# Patient Record
Sex: FEMALE | Race: WHITE | HISPANIC OR LATINO | Employment: UNEMPLOYED | URBAN - METROPOLITAN AREA
[De-identification: names, ages, dates, MRNs, and addresses within clinical notes are randomized per-mention and may not be internally consistent; named-entity substitution may affect disease eponyms.]

---

## 2017-08-30 ENCOUNTER — APPOINTMENT (OUTPATIENT)
Dept: RADIOLOGY | Facility: MEDICAL CENTER | Age: 27
End: 2017-08-30
Attending: EMERGENCY MEDICINE
Payer: COMMERCIAL

## 2017-08-30 ENCOUNTER — HOSPITAL ENCOUNTER (EMERGENCY)
Facility: MEDICAL CENTER | Age: 27
End: 2017-08-30
Attending: EMERGENCY MEDICINE
Payer: COMMERCIAL

## 2017-08-30 VITALS
WEIGHT: 180.78 LBS | BODY MASS INDEX: 30.12 KG/M2 | HEIGHT: 65 IN | OXYGEN SATURATION: 97 % | DIASTOLIC BLOOD PRESSURE: 68 MMHG | TEMPERATURE: 97.5 F | RESPIRATION RATE: 16 BRPM | HEART RATE: 82 BPM | SYSTOLIC BLOOD PRESSURE: 138 MMHG

## 2017-08-30 DIAGNOSIS — F07.81 POST CONCUSSIVE SYNDROME: ICD-10-CM

## 2017-08-30 DIAGNOSIS — S09.90XA CLOSED HEAD INJURY, INITIAL ENCOUNTER: ICD-10-CM

## 2017-08-30 PROCEDURE — 99284 EMERGENCY DEPT VISIT MOD MDM: CPT

## 2017-08-30 PROCEDURE — 70450 CT HEAD/BRAIN W/O DYE: CPT

## 2017-08-30 ASSESSMENT — ENCOUNTER SYMPTOMS
HEADACHES: 1
TREMORS: 0
BLURRED VISION: 1
SPEECH CHANGE: 0
WEAKNESS: 0
TINGLING: 0
NECK PAIN: 0
ABDOMINAL PAIN: 0
VOMITING: 0
FOCAL WEAKNESS: 0
SENSORY CHANGE: 0
NAUSEA: 0
FEVER: 0
SHORTNESS OF BREATH: 0
DIZZINESS: 1

## 2017-08-30 ASSESSMENT — PAIN SCALES - GENERAL
PAINLEVEL_OUTOF10: 0
PAINLEVEL_OUTOF10: 0

## 2017-08-30 ASSESSMENT — LIFESTYLE VARIABLES: DO YOU DRINK ALCOHOL: NO
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
## 2017-08-30 NOTE — LETTER
"   [facility], EMERGENCY DEPT   [address], [place] [address]  Phone: Dept: [phone] - Fax:        [facility] Progress Report and Disability Certification  Date of Service: 8/30/2017   No Show:  No  Date / Time of Next Visit:     Claim Information   Patient Name: Tiffanie Bauer  Claim Number:  [id number]   Employer: [employer]  Date of Injury: 8/25/2017     Insurer / TPA: [employer] ID / SSN:    Occupation:  Diagnosis: Diagnoses of Post concussive syndrome and Closed head injury, initial encounter were pertinent to this visit.    Medical Information   Related to Industrial Injury? Yes ***   Subjective Complaints:  Condition demonstrating findings and symptoms of postconcussive syndrome. Sent here from [facility] for a repeat CT scan to \"rule out bleed\".   Objective Findings: Normal neurologic exam. Patient demonstrating clinical symptoms of postconcussive syndrome.   Pre-Existing Condition(s): No known pre-existing conditions   Assessment:   Condition Same    Status: Additional Care Required  Comments:patient is to follow-up with Worker's Compensation  Permanent Disability:No    Plan:   Comments:recommend patient be off work with light duty upon return    Diagnostics: CT  Comments:CT showed no abnormalities    Comments:  Recommend no work for the rest of this week. Recommend when patient returns to work on Monday, that she engage in light duty which would include no extended periods of being on the computer, reading or concentrating without frequent breaks.    Disability Information   Status:      From:     Through:   Restrictions are:     Physical Restrictions   Sitting:    Standing:    Stooping:    Bending:      Squatting:    Walking:    Climbing:    Pushing:      Pulling:    Other:    Reaching Above Shoulder (L):   Reaching Above Shoulder (R):       Reaching Below Shoulder (L):    Reaching Below Shoulder " (R):      Not to exceed Weight Limits   Carrying(hrs):   Weight Limit(lb):   Lifting(hrs):   Weight  Limit(lb):     Comments: Recommend no work for the rest of this week. Recommend when patient returns to work on Monday, that she engage in light duty which would include no extended periods of being on the computer, reading or concentrating without frequent breaks.    Repetitive Actions   Hands: i.e. Fine Manipulations from Grasping:     Feet: i.e. Operating Foot Controls:     Driving / Operate Machinery:     Physician Name: Uriel Ramirez Physician Signature: URIEL Ochoa D.O. e-Signature:  , Medical Director   Clinic Name / Location: [facility], EMERGENCY DEPT  [address] [place] [address]  [phone]     Clinic Phone Number: Dept: [phone]   Appointment Time:  Visit Start Time:    Check-In Time:  2:27 PM Visit Discharge Time:    Original-Treating Physician or Chiropractor    Page 2-Insurer/TPA    Page 3-Employer    Page 4-Employee
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
## 2017-08-30 NOTE — LETTER
"   [facility], EMERGENCY DEPT   [address], [place] [address]  Phone: Dept: [phone] - Fax:        [facility] Progress Report and Disability Certification  Date of Service: 8/30/2017   No Show:  No  Date / Time of Next Visit:     Claim Information   Patient Name: Tiffanie Bauer  Claim Number:     Employer: [employer]  Date of Injury: 8/25/2017     Insurer / TPA: [employer] ID / SSN:     Occupation:  Diagnosis: Diagnoses of Post concussive syndrome and Closed head injury, initial encounter were pertinent to this visit.    Medical Information   Related to Industrial Injury? Yes ***   Subjective Complaints:  Condition demonstrating findings and symptoms of postconcussive syndrome. Sent here from [facility] for a repeat CT scan to \"rule out bleed\".   Objective Findings: Normal neurologic exam. Patient demonstrating clinical symptoms of postconcussive syndrome.   Pre-Existing Condition(s): No known pre-existing conditions   Assessment:   Condition Same    Status: Additional Care Required  Comments:patient is to follow-up with Worker's Compensation  Permanent Disability:No    Plan:   Comments:recommend patient be off work with light duty upon return    Diagnostics: CT  Comments:CT showed no abnormalities    Comments:  Recommend no work for the rest of this week. Recommend when patient returns to work on Monday, that she engage in light duty which would include no extended periods of being on the computer, reading or concentrating without frequent breaks.    Disability Information   Status:      From:     Through:   Restrictions are:     Physical Restrictions   Sitting:    Standing:    Stooping:    Bending:      Squatting:    Walking:    Climbing:    Pushing:      Pulling:    Other:    Reaching Above Shoulder (L):   Reaching Above Shoulder (R):       Reaching Below Shoulder (L):    Reaching Below Shoulder (R):      Not to " exceed Weight Limits   Carrying(hrs):   Weight Limit(lb):   Lifting(hrs):   Weight  Limit(lb):     Comments: Recommend no work for the rest of this week. Recommend when patient returns to work on Monday, that she engage in light duty which would include no extended periods of being on the computer, reading or concentrating without frequent breaks.    Repetitive Actions   Hands: i.e. Fine Manipulations from Grasping:     Feet: i.e. Operating Foot Controls:     Driving / Operate Machinery:     Physician Name: Uriel Ramirez Physician Signature: URIEL Ochoa D.O. e-Signature:  , Medical Director   Clinic Name / Location: [facility], EMERGENCY DEPT  [address] [place] [address]  [phone]     Clinic Phone Number: Dept: [phone]   Appointment Time:  Visit Start Time:    Check-In Time:  2:27 PM Visit Discharge Time:    Original-Treating Physician or Chiropractor    Page 2-Insurer/TPA    Page 3-Employer    Page 4-Employee

## 2017-08-30 NOTE — LETTER
"   [facility], EMERGENCY DEPT   [address], [place] [address]  Phone: Dept: [phone] - Fax:        [facility] Progress Report and Disability Certification  Date of Service: 8/30/2017   No Show:  No  Date / Time of Next Visit:     Claim Information   Patient Name: Tiffanie Bauer  Claim Number:  [id number]   Employer: [employer]  Date of Injury: 8/25/2017     Insurer / TPA: [employer] ID / SSN: [id number]    Occupation:  Diagnosis: Diagnoses of Post concussive syndrome and Closed head injury, initial encounter were pertinent to this visit.    Medical Information   Related to Industrial Injury? Yes ***   Subjective Complaints:  Condition demonstrating findings and symptoms of postconcussive syndrome. Sent here from [facility] for a repeat CT scan to \"rule out bleed\".   Objective Findings: Normal neurologic exam. Patient demonstrating clinical symptoms of postconcussive syndrome.   Pre-Existing Condition(s): No known pre-existing conditions   Assessment:   Condition Same    Status: Additional Care Required  Comments:patient is to follow-up with Worker's Compensation  Permanent Disability:No    Plan:   Comments:recommend patient be off work with light duty upon return    Diagnostics: CT  Comments:CT showed no abnormalities    Comments:  Recommend no work for the rest of this week. Recommend when patient returns to work on Monday, that she engage in light duty which would include no extended periods of being on the computer, reading or concentrating without frequent breaks.    Disability Information   Status:      From:     Through:   Restrictions are:     Physical Restrictions   Sitting:    Standing:    Stooping:    Bending:      Squatting:    Walking:    Climbing:    Pushing:      Pulling:    Other:    Reaching Above Shoulder (L):   Reaching Above Shoulder (R):       Reaching Below Shoulder (L):    Reaching Below Shoulder " (R):      Not to exceed Weight Limits   Carrying(hrs):   Weight Limit(lb):   Lifting(hrs):   Weight  Limit(lb):     Comments: Recommend no work for the rest of this week. Recommend when patient returns to work on Monday, that she engage in light duty which would include no extended periods of being on the computer, reading or concentrating without frequent breaks.    Repetitive Actions   Hands: i.e. Fine Manipulations from Grasping:     Feet: i.e. Operating Foot Controls:     Driving / Operate Machinery:     Physician Name: Uriel Ramirez Physician Signature: URIEL Ochoa D.O. e-Signature:  , Medical Director   Clinic Name / Location: [facility], EMERGENCY DEPT  [address] [place] [address]  [phone]     Clinic Phone Number: Dept: [phone]   Appointment Time:  Visit Start Time:    Check-In Time:  2:27 PM Visit Discharge Time:    Original-Treating Physician or Chiropractor    Page 2-Insurer/TPA    Page 3-Employer    Page 4-Employee

## 2017-08-31 NOTE — DISCHARGE INSTRUCTIONS
Post-Concussion Syndrome  Post-concussion syndrome describes the symptoms that can occur after a head injury. These symptoms can last from weeks to months.  CAUSES   It is not clear why some head injuries cause post-concussion syndrome. It can occur whether your head injury was mild or severe and whether you were wearing head protection or not.   SIGNS AND SYMPTOMS  · Memory difficulties.  · Dizziness.  · Headaches.  · Double vision or blurry vision.  · Sensitivity to light.  · Hearing difficulties.  · Depression.  · Tiredness.  · Weakness.  · Difficulty with concentration.  · Difficulty sleeping or staying asleep.  · Vomiting.  · Poor balance or instability on your feet.  · Slow reaction time.  · Difficulty learning and remembering things you have heard.  DIAGNOSIS   There is no test to determine whether you have post-concussion syndrome. Your health care provider may order an imaging scan of your brain, such as a CT scan, to check for other problems that may be causing your symptoms (such as a severe injury inside your skull).  TREATMENT   Usually, these problems disappear over time without medical care. Your health care provider may prescribe medicine to help ease your symptoms. It is important to follow up with a neurologist to evaluate your recovery and address any lingering symptoms or issues.  HOME CARE INSTRUCTIONS   · Take medicines only as directed by your health care provider. Do not take aspirin. Aspirin can slow blood clotting.  · Sleep with your head slightly elevated to help with headaches.  · Avoid any situation where there is potential for another head injury. This includes football, hockey, soccer, basketball, martial arts, downhill snow sports, and horseback riding. Your condition will get worse every time you experience a concussion. You should avoid these activities until you are evaluated by the appropriate follow-up health care providers.  · Keep all follow-up visits as directed by your health  care provider. This is important.  SEEK MEDICAL CARE IF:  · You have increased problems paying attention or concentrating.  · You have increased difficulty remembering or learning new information.  · You need more time to complete tasks or assignments than before.  · You have increased irritability or decreased ability to cope with stress.  · You have more symptoms than before.  Seek medical care if you have any of the following symptoms for more than two weeks after your injury:  · Lasting (chronic) headaches.  · Dizziness or balance problems.  · Nausea.  · Vision problems.  · Increased sensitivity to noise or light.  · Depression or mood swings.  · Anxiety or irritability.  · Memory problems.  · Difficulty concentrating or paying attention.  · Sleep problems.  · Feeling tired all the time.  SEEK IMMEDIATE MEDICAL CARE IF:  · You have confusion or unusual drowsiness.  · Others find it difficult to wake you up.  · You have nausea or persistent, forceful vomiting.  · You feel like you are moving when you are not (vertigo). Your eyes may move rapidly back and forth.  · You have convulsions or faint.  · You have severe, persistent headaches that are not relieved by medicine.  · You cannot use your arms or legs normally.  · One of your pupils is larger than the other.  · You have clear or bloody discharge from your nose or ears.  · Your problems are getting worse, not better.  MAKE SURE YOU:  · Understand these instructions.  · Will watch your condition.  · Will get help right away if you are not doing well or get worse.     This information is not intended to replace advice given to you by your health care provider. Make sure you discuss any questions you have with your health care provider.     Document Released: 06/09/2003 Document Revised: 01/08/2016 Document Reviewed: 03/25/2015  [employer] Interactive Patient Education ©2016 [employer]      Head Injury, Adult  You have received a head injury. It does not appear  serious at this time. Headaches and vomiting are common following head injury. It should be easy to awaken from sleeping. Sometimes it is necessary for you to stay in the emergency department for a while for observation. Sometimes admission to the hospital may be needed. After injuries such as yours, most problems occur within the first 24 hours, but side effects may occur up to 7-10 days after the injury. It is important for you to carefully monitor your condition and contact your health care provider or seek immediate medical care if there is a change in your condition.  WHAT ARE THE TYPES OF HEAD INJURIES?  Head injuries can be as minor as a bump. Some head injuries can be more severe. More severe head injuries include:  · A jarring injury to the brain (concussion).  · A bruise of the brain (contusion). This mean there is bleeding in the brain that can cause swelling.  · A cracked skull (skull fracture).  · Bleeding in the brain that collects, clots, and forms a bump (hematoma).  WHAT CAUSES A HEAD INJURY?  A serious head injury is most likely to happen to someone who is in a car wreck and is not wearing a seat belt. Other causes of major head injuries include bicycle or motorcycle accidents, sports injuries, and falls.  HOW ARE HEAD INJURIES DIAGNOSED?  A complete history of the event leading to the injury and your current symptoms will be helpful in diagnosing head injuries. Many times, pictures of the brain, such as CT or MRI are needed to see the extent of the injury. Often, an overnight hospital stay is necessary for observation.   WHEN SHOULD I SEEK IMMEDIATE MEDICAL CARE?   You should get help right away if:  · You have confusion or drowsiness.  · You feel sick to your stomach (nauseous) or have continued, forceful vomiting.  · You have dizziness or unsteadiness that is getting worse.  · You have severe, continued headaches not relieved by medicine. Only take over-the-counter or prescription medicines for  pain, fever, or discomfort as directed by your health care provider.  · You do not have normal function of the arms or legs or are unable to walk.  · You notice changes in the black spots in the center of the colored part of your eye (pupil).  · You have a clear or bloody fluid coming from your nose or ears.  · You have a loss of vision.  During the next 24 hours after the injury, you must stay with someone who can watch you for the warning signs. This person should contact local emergency services (911 in the [place] if you have seizures, you become unconscious, or you are unable to wake up.  HOW CAN I PREVENT A HEAD INJURY IN THE FUTURE?  The most important factor for preventing major head injuries is avoiding motor vehicle accidents.  To minimize the potential for damage to your head, it is crucial to wear seat belts while riding in motor vehicles. Wearing helmets while bike riding and playing collision sports (like football) is also helpful. Also, avoiding dangerous activities around the house will further help reduce your risk of head injury.   WHEN CAN I RETURN TO NORMAL ACTIVITIES AND ATHLETICS?  You should be reevaluated by your health care provider before returning to these activities. If you have any of the following symptoms, you should not return to activities or contact sports until 1 week after the symptoms have stopped:  · Persistent headache.  · Dizziness or vertigo.  · Poor attention and concentration.  · Confusion.  · Memory problems.  · Nausea or vomiting.  · Fatigue or tire easily.  · Irritability.  · Intolerant of bright lights or loud noises.  · Anxiety or depression.  · Disturbed sleep.  MAKE SURE YOU:   · Understand these instructions.  · Will watch your condition.  · Will get help right away if you are not doing well or get worse.     This information is not intended to replace advice given to you by your health care provider. Make sure you discuss any questions you have with your health care  provider.     Document Released: 12/18/2006 Document Revised: 01/08/2016 Document Reviewed: 08/25/2014  [employer] Interactive Patient Education ©2016 [employer]

## 2018-05-17 ENCOUNTER — HOSPITAL ENCOUNTER (OUTPATIENT)
Dept: LAB | Facility: MEDICAL CENTER | Age: 28
End: 2018-05-17
Attending: OBSTETRICS & GYNECOLOGY
Payer: COMMERCIAL

## 2018-05-17 LAB
ESTRADIOL SERPL-MCNC: 280 PG/ML
FSH SERPL-ACNC: 6 MIU/ML
PROGEST SERPL-MCNC: 1.07 NG/ML

## 2018-05-17 PROCEDURE — 36415 COLL VENOUS BLD VENIPUNCTURE: CPT

## 2018-05-17 PROCEDURE — 83001 ASSAY OF GONADOTROPIN (FSH): CPT

## 2018-05-17 PROCEDURE — 82670 ASSAY OF TOTAL ESTRADIOL: CPT

## 2018-05-17 PROCEDURE — 84144 ASSAY OF PROGESTERONE: CPT

## 2018-08-10 ENCOUNTER — NON-PROVIDER VISIT (OUTPATIENT)
Dept: OBGYN | Facility: CLINIC | Age: 28
End: 2018-08-10

## 2018-08-10 DIAGNOSIS — Z32.01 POSITIVE URINE PREGNANCY TEST: ICD-10-CM

## 2018-08-10 LAB
INT CON NEG: NEGATIVE
INT CON POS: POSITIVE
POC URINE PREGNANCY TEST: POSITIVE

## 2018-08-10 PROCEDURE — 81025 URINE PREGNANCY TEST: CPT | Performed by: OBSTETRICS & GYNECOLOGY

## 2018-09-28 ENCOUNTER — INITIAL PRENATAL (OUTPATIENT)
Dept: OBGYN | Facility: CLINIC | Age: 28
End: 2018-09-28

## 2018-09-28 ENCOUNTER — HOSPITAL ENCOUNTER (OUTPATIENT)
Facility: MEDICAL CENTER | Age: 28
End: 2018-09-28
Attending: NURSE PRACTITIONER
Payer: COMMERCIAL

## 2018-09-28 VITALS — WEIGHT: 207 LBS | BODY MASS INDEX: 34.45 KG/M2 | SYSTOLIC BLOOD PRESSURE: 112 MMHG | DIASTOLIC BLOOD PRESSURE: 76 MMHG

## 2018-09-28 DIAGNOSIS — Z34.82 ENCOUNTER FOR SUPERVISION OF OTHER NORMAL PREGNANCY, SECOND TRIMESTER: Primary | ICD-10-CM

## 2018-09-28 DIAGNOSIS — Z34.82 ENCOUNTER FOR SUPERVISION OF OTHER NORMAL PREGNANCY, SECOND TRIMESTER: ICD-10-CM

## 2018-09-28 LAB
APPEARANCE UR: CLEAR
BILIRUB UR STRIP-MCNC: NORMAL MG/DL
COLOR UR AUTO: YELLOW
GLUCOSE UR STRIP.AUTO-MCNC: NEGATIVE MG/DL
KETONES UR STRIP.AUTO-MCNC: NORMAL MG/DL
LEUKOCYTE ESTERASE UR QL STRIP.AUTO: NORMAL
NITRITE UR QL STRIP.AUTO: NEGATIVE
PH UR STRIP.AUTO: 5.5 [PH] (ref 5–8)
PROT UR QL STRIP: 30 MG/DL
RBC UR QL AUTO: NEGATIVE
SP GR UR STRIP.AUTO: 1.03
UROBILINOGEN UR STRIP-MCNC: NORMAL MG/DL

## 2018-09-28 PROCEDURE — 59401 PR NEW OB VISIT: CPT | Performed by: NURSE PRACTITIONER

## 2018-09-28 PROCEDURE — 81002 URINALYSIS NONAUTO W/O SCOPE: CPT | Performed by: NURSE PRACTITIONER
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
## 2018-09-28 NOTE — PATIENT INSTRUCTIONS
P:  1.  GC/CT done. Pap records requested.         2.  Prenatal labs ordered - lab slip given        3.  Discussed PNV, diet, and adequate water intake        4.  NOB packet given        5.  Return to office in 4 wks        6.  Complete OB US in 7 wks

## 2018-09-28 NOTE — PROGRESS NOTES
Subjective:   Tiffanie Jaffe is a 28 y.o.   @ A: 13w0d HALEY: Estimated Date of Delivery: 19  per LNMP who presents for her new OB exam.  She has no complaints.  Desires AFP.  Declines CF.  Reports no FM, VB, LOF, or cramping.  Denies dysuria, vaginal DC.  Pt is  and lives with  and daughter. Not presently working.  Pregnancy is planned and wanted.  Unsure about Centering Pregnancy.     Initial Prenatal Visit          HPI  Review of Systems   All other systems reviewed and are negative.         Objective:     /76   Wt 93.9 kg (207 lb)   LMP 2018   BMI 34.45 kg/m²    Wet mount: deferred  FHTs: 155, BSUS +FM and CRL c/w LNMP.    Fundal ht: 13       Physical Exam   Constitutional: She is oriented to person, place, and time. She appears well-developed and well-nourished.   HENT:   Head: Normocephalic and atraumatic.   Eyes:   Eye and ear exam deferred   Neck: Normal range of motion. Neck supple. No thyromegaly present.   Cardiovascular: Normal rate, regular rhythm, normal heart sounds and intact distal pulses.    Pulmonary/Chest: Effort normal and breath sounds normal. Right breast exhibits no inverted nipple, no mass, no nipple discharge, no skin change and no tenderness. Left breast exhibits no inverted nipple, no mass, no nipple discharge, no skin change and no tenderness.   Abdominal: Soft. Bowel sounds are normal.    x 1 - proven to 3100g   Genitourinary: Vagina normal and uterus normal. Pelvic exam was performed with patient supine. There is no rash, tenderness, lesion or injury on the right labia. There is no rash, tenderness, lesion or injury on the left labia. Cervix exhibits no motion tenderness, no discharge and no friability. Right adnexum displays no mass, no tenderness and no fullness. Left adnexum displays no mass, no tenderness and no fullness.   Musculoskeletal: Normal range of motion.   Neurological: She is alert and oriented to person, place,  and time.   Skin: Skin is warm and dry. Capillary refill takes less than 2 seconds.   Psychiatric: She has a normal mood and affect. Her behavior is normal. Judgment and thought content normal.   Nursing note and vitals reviewed.          A:   1.  IUP @ 13w0d HALEY: Estimated Date of Delivery: 4/5/19 per [facility]         2.  S=D        3.    Patient Active Problem List    Diagnosis Date Noted   • Encounter for supervision of other normal pregnancy, second trimester 09/28/2018         P:  1.  GC/CT done. Pap records requested.         2.  Prenatal labs ordered - lab slip given        3.  Discussed PNV, diet, and adequate water intake        4.  NOB packet given        5.  Return to office in 4 wks        6.  Complete OB US in 7 wks

## 2018-09-28 NOTE — LETTER
Cystic Fibrosis Carrier Testing  Tiffanie Jaffe    The following information is about a blood test that can be done to determine if you and/or your partner carry the gene for cystic fibrosis.    WHAT IS CYSTIC FIBROSIS?  · Cystic fibrosis (CF) is an inherited disease that affects more than 25,000 American children and young adults.  · Symptoms of CF vary but include lung congestion, pneumonia, diarrhea and poor growth.  Most people with CF have severe medical problems and some die at a young age.  Others have so few symptoms they are unaware they have CF.  · CF does not affect intelligence.  · Although there is no cure for CF at this time, scientists are making progress in improving treatment and in searching for a cure.  In the past many people with CF  at a very young age.  Today, many are living into their 20’s and 30’s.    IS THERE A CHANCE MY BABY COULD HAVE CYSTIC FIBROSIS?  · You can have a child with CF even if there is no history in your family (see chart below).  · CF testing can help determine if you are a carrier and at risk to have a child with CF.  Note: if both parents are carriers, there is a 1 in 4 (25%) chance with each pregnancy that they will have a child with CF.  · Carriers have one normal CF gene and one altered CF gene.  · People with CF have two altered CF genes.  · Most people have two normal copies of the CF gene.    Approximate risk that a couple with no family history of cystic fibrosis will have a child with cystic fibrosis:    Ethnic background / Risk     couple:  1 in 2,500   couple:  1 in 15,000            couple:  1 in 8,000     American couple:  1 in 32,000     WHAT TESTING IS AVAILABLE?  · There is a blood test that can be done to find out if you or your partner is a carrier.  · It is important to understand that CF carrier testing does not detect all CF carriers.  · If the test shows that you are both CF carriers, you unborn baby can  be tested to find out if the baby has CF.    HOW MUCH DOES IT COST TO HAVE CYSTIC FIBROSIS CARRIER TESTING?  · Cost and insurance coverage for CF carrier testing vary depending upon the laboratory used and your insurance policy.  · The average cost for CF carrier testing is $300 per person.  · Your genetic counselor can provide you with more information about cystic fibrosis carrier testing.    _____  Yes, I am interested in discussing carrier testing with a genetic counselor.    _____  No, I am not interested in CF carrier testing or in receiving more information about CF carrier testing.      Client signature: ________________________________________  9/28/2018

## 2018-09-30 LAB
C TRACH DNA SPEC QL NAA+PROBE: NEGATIVE
N GONORRHOEA DNA SPEC QL NAA+PROBE: NEGATIVE
SPECIMEN SOURCE: NORMAL

## 2018-10-24 ENCOUNTER — HOSPITAL ENCOUNTER (OUTPATIENT)
Dept: LAB | Facility: MEDICAL CENTER | Age: 28
End: 2018-10-24
Attending: NURSE PRACTITIONER

## 2018-10-24 DIAGNOSIS — Z34.82 ENCOUNTER FOR SUPERVISION OF OTHER NORMAL PREGNANCY, SECOND TRIMESTER: ICD-10-CM

## 2018-10-24 LAB
ABO GROUP BLD: NORMAL
AMORPH CRY #/AREA URNS HPF: PRESENT /HPF
APPEARANCE UR: ABNORMAL
BACTERIA #/AREA URNS HPF: ABNORMAL /HPF
BASOPHILS # BLD AUTO: 0.2 % (ref 0–1.8)
BASOPHILS # BLD: 0.03 K/UL (ref 0–0.12)
BILIRUB UR QL STRIP.AUTO: NEGATIVE
BLD GP AB SCN SERPL QL: NORMAL
COLOR UR: ABNORMAL
EOSINOPHIL # BLD AUTO: 0.06 K/UL (ref 0–0.51)
EOSINOPHIL NFR BLD: 0.4 % (ref 0–6.9)
EPI CELLS #/AREA URNS HPF: ABNORMAL /HPF
ERYTHROCYTE [DISTWIDTH] IN BLOOD BY AUTOMATED COUNT: 41.6 FL (ref 35.9–50)
GLUCOSE UR STRIP.AUTO-MCNC: NEGATIVE MG/DL
HBV SURFACE AG SER QL: NEGATIVE
HCT VFR BLD AUTO: 43.6 % (ref 37–47)
HGB BLD-MCNC: 14.7 G/DL (ref 12–16)
HIV 1+2 AB+HIV1 P24 AG SERPL QL IA: NON REACTIVE
HYALINE CASTS #/AREA URNS LPF: ABNORMAL /LPF
IMM GRANULOCYTES # BLD AUTO: 0.04 K/UL (ref 0–0.11)
IMM GRANULOCYTES NFR BLD AUTO: 0.3 % (ref 0–0.9)
KETONES UR STRIP.AUTO-MCNC: ABNORMAL MG/DL
LEUKOCYTE ESTERASE UR QL STRIP.AUTO: ABNORMAL
LYMPHOCYTES # BLD AUTO: 1.96 K/UL (ref 1–4.8)
LYMPHOCYTES NFR BLD: 13.3 % (ref 22–41)
MCH RBC QN AUTO: 29.8 PG (ref 27–33)
MCHC RBC AUTO-ENTMCNC: 33.7 G/DL (ref 33.6–35)
MCV RBC AUTO: 88.3 FL (ref 81.4–97.8)
MICRO URNS: ABNORMAL
MONOCYTES # BLD AUTO: 0.73 K/UL (ref 0–0.85)
MONOCYTES NFR BLD AUTO: 4.9 % (ref 0–13.4)
NEUTROPHILS # BLD AUTO: 11.95 K/UL (ref 2–7.15)
NEUTROPHILS NFR BLD: 80.9 % (ref 44–72)
NITRITE UR QL STRIP.AUTO: NEGATIVE
NRBC # BLD AUTO: 0 K/UL
NRBC BLD-RTO: 0 /100 WBC
PH UR STRIP.AUTO: 5.5 [PH]
PLATELET # BLD AUTO: 265 K/UL (ref 164–446)
PMV BLD AUTO: 9.1 FL (ref 9–12.9)
PROT UR QL STRIP: NEGATIVE MG/DL
RBC # BLD AUTO: 4.94 M/UL (ref 4.2–5.4)
RBC # URNS HPF: ABNORMAL /HPF
RBC UR QL AUTO: NEGATIVE
RH BLD: NORMAL
RUBV AB SER QL: 6.3 IU/ML
SP GR UR STRIP.AUTO: 1.02
TREPONEMA PALLIDUM IGG+IGM AB [PRESENCE] IN SERUM OR PLASMA BY IMMUNOASSAY: NON REACTIVE
UROBILINOGEN UR STRIP.AUTO-MCNC: 1 MG/DL
WBC # BLD AUTO: 14.8 K/UL (ref 4.8–10.8)
WBC #/AREA URNS HPF: ABNORMAL /HPF

## 2018-10-24 PROCEDURE — 86901 BLOOD TYPING SEROLOGIC RH(D): CPT

## 2018-10-24 PROCEDURE — 85025 COMPLETE CBC W/AUTO DIFF WBC: CPT

## 2018-10-24 PROCEDURE — 86850 RBC ANTIBODY SCREEN: CPT

## 2018-10-24 PROCEDURE — 87389 HIV-1 AG W/HIV-1&-2 AB AG IA: CPT

## 2018-10-24 PROCEDURE — 36415 COLL VENOUS BLD VENIPUNCTURE: CPT

## 2018-10-24 PROCEDURE — 81001 URINALYSIS AUTO W/SCOPE: CPT

## 2018-10-24 PROCEDURE — 86900 BLOOD TYPING SEROLOGIC ABO: CPT

## 2018-10-24 PROCEDURE — 87340 HEPATITIS B SURFACE AG IA: CPT

## 2018-10-24 PROCEDURE — 87086 URINE CULTURE/COLONY COUNT: CPT

## 2018-10-24 PROCEDURE — 86780 TREPONEMA PALLIDUM: CPT

## 2018-10-24 PROCEDURE — 86762 RUBELLA ANTIBODY: CPT

## 2018-10-27 LAB
BACTERIA UR CULT: NORMAL
SIGNIFICANT IND 70042: NORMAL
SITE SITE: NORMAL
SOURCE SOURCE: NORMAL

## 2018-10-29 ENCOUNTER — ROUTINE PRENATAL (OUTPATIENT)
Dept: OBGYN | Facility: CLINIC | Age: 28
End: 2018-10-29

## 2018-10-29 ENCOUNTER — HOSPITAL ENCOUNTER (OUTPATIENT)
Facility: MEDICAL CENTER | Age: 28
End: 2018-10-29
Attending: NURSE PRACTITIONER
Payer: COMMERCIAL

## 2018-10-29 VITALS — SYSTOLIC BLOOD PRESSURE: 112 MMHG | WEIGHT: 209 LBS | BODY MASS INDEX: 34.78 KG/M2 | DIASTOLIC BLOOD PRESSURE: 50 MMHG

## 2018-10-29 DIAGNOSIS — Z34.82 ENCOUNTER FOR SUPERVISION OF OTHER NORMAL PREGNANCY, SECOND TRIMESTER: ICD-10-CM

## 2018-10-29 PROCEDURE — 90040 PR PRENATAL FOLLOW UP: CPT | Performed by: NURSE PRACTITIONER

## 2018-10-29 NOTE — PROGRESS NOTES
SUBJECTIVE:  Pt is a 28 y.o.   at 17w3d  gestation. Presents today for follow-up prenatal care. Reports no issues at this time.  Reports positive  fetal movement. Denies cramping/contractions, bleeding or leaking of fluid. Denies dysuria, headaches, N/V, or other issues at this time. Generally feels well today.     OBJECTIVE:  - See prenatal vitals flow  -   Vitals:    10/29/18 1011   BP: 112/50   Weight: 94.8 kg (209 lb)        Labs - normal pnp  US scheduled.       ASSESSMENT:   - IUP at 17w3d    - S=D   -   Patient Active Problem List    Diagnosis Date Noted   • Encounter for supervision of other normal pregnancy, second trimester 2018         PLAN:  - S/sx pregnancy and labor warning signs vs general discomforts discussed  - Fetal movements and kick counts reviewed   - Adequate hydration reinforced  - Nutrition/exercise/vitamin education; continued PNV  -  Pap done today as we did not get records  AFP slip given.   Flu declined.

## 2018-10-29 NOTE — PROGRESS NOTES
Pt here today for OB follow up  Reports light FM  WT: 209 lb  BP: 112/50  Influenza vaccine offered to patient today, pt declines the influenza vaccine.   US on 11/26  Pt states no complaints today  Good # [phone]

## 2018-10-30 DIAGNOSIS — Z34.82 ENCOUNTER FOR SUPERVISION OF OTHER NORMAL PREGNANCY, SECOND TRIMESTER: ICD-10-CM

## 2018-10-30 LAB — CYTOLOGY REG CYTOL: NORMAL

## 2018-11-26 ENCOUNTER — HOSPITAL ENCOUNTER (OUTPATIENT)
Dept: LAB | Facility: MEDICAL CENTER | Age: 28
End: 2018-11-26
Attending: NURSE PRACTITIONER
Payer: COMMERCIAL

## 2018-11-26 ENCOUNTER — ROUTINE PRENATAL (OUTPATIENT)
Dept: OBGYN | Facility: CLINIC | Age: 28
End: 2018-11-26

## 2018-11-26 ENCOUNTER — DATING (OUTPATIENT)
Dept: OBGYN | Facility: CLINIC | Age: 28
End: 2018-11-26

## 2018-11-26 ENCOUNTER — APPOINTMENT (OUTPATIENT)
Dept: RADIOLOGY | Facility: IMAGING CENTER | Age: 28
End: 2018-11-26
Attending: NURSE PRACTITIONER

## 2018-11-26 VITALS — WEIGHT: 213 LBS | BODY MASS INDEX: 35.45 KG/M2 | SYSTOLIC BLOOD PRESSURE: 122 MMHG | DIASTOLIC BLOOD PRESSURE: 68 MMHG

## 2018-11-26 DIAGNOSIS — Z34.82 ENCOUNTER FOR SUPERVISION OF OTHER NORMAL PREGNANCY, SECOND TRIMESTER: ICD-10-CM

## 2018-11-26 PROCEDURE — 76805 OB US >/= 14 WKS SNGL FETUS: CPT | Performed by: OBSTETRICS & GYNECOLOGY

## 2018-11-26 PROCEDURE — 90040 PR PRENATAL FOLLOW UP: CPT | Performed by: NURSE PRACTITIONER

## 2018-11-26 PROCEDURE — 99999 US-OB 2ND 3RD TRI COMPLETE: CPT | Mod: TC | Performed by: NURSE PRACTITIONER

## 2018-11-26 NOTE — PROGRESS NOTES
SUBJECTIVE:  Pt is a 28 y.o.   at 21w3d  gestation. Presents today for follow-up prenatal care. Reports no issues at this time.  Reports feeling good  fetal movement. Denies cramping/contractions, bleeding or leaking of fluid. Denies dysuria, headaches, N/V, or other issues at this time. Generally feels well today.     OBJECTIVE:  - See prenatal vitals flow  -   Vitals:    18 1119   BP: 122/68   Weight: 96.6 kg (213 lb)      Labs - not yet  US - scheduled for today.            ASSESSMENT:   - IUP at 21w3d    - S=D   -   Patient Active Problem List    Diagnosis Date Noted   • Encounter for supervision of other normal pregnancy, second trimester 2018         PLAN:  - S/sx pregnancy and labor warning signs vs general discomforts discussed  - Fetal movements and kick counts reviewed   - Adequate hydration reinforced  - Nutrition/exercise/vitamin education; continued PNV  - Will do labs today. Has US scheduled for this afternoon.

## 2018-11-29 LAB
# FETUSES US: NORMAL
AFP MOM SERPL: 0.52
AFP SERPL-MCNC: 29 NG/ML
AGE - REPORTED: 26.9 YR
CURRENT SMOKER: NO
FAMILY MEMBER DISEASES HX: NO
GA METHOD: NORMAL
GA: NORMAL WK
HCG MOM SERPL: 0.98
HCG SERPL-ACNC: NORMAL IU/L
HX OF HEREDITARY DISORDERS: NO
IDDM PATIENT QL: NO
INHIBIN A MOM SERPL: 0.58
INHIBIN A SERPL-MCNC: 105 PG/ML
INTEGRATED SCN PATIENT-IMP: NORMAL
PATHOLOGY STUDY: NORMAL
SPECIMEN DRAWN SERPL: NORMAL
U ESTRIOL MOM SERPL: 0.63
U ESTRIOL SERPL-MCNC: 1.49 NG/ML

## 2019-01-16 ENCOUNTER — HOSPITAL ENCOUNTER (OUTPATIENT)
Facility: MEDICAL CENTER | Age: 29
End: 2019-01-16
Attending: NURSE PRACTITIONER
Payer: COMMERCIAL

## 2019-01-16 ENCOUNTER — APPOINTMENT (OUTPATIENT)
Dept: RADIOLOGY | Facility: MEDICAL CENTER | Age: 29
End: 2019-01-16
Attending: OBSTETRICS & GYNECOLOGY

## 2019-01-16 ENCOUNTER — HOSPITAL ENCOUNTER (OUTPATIENT)
Facility: MEDICAL CENTER | Age: 29
End: 2019-01-16
Attending: OBSTETRICS & GYNECOLOGY | Admitting: OBSTETRICS & GYNECOLOGY

## 2019-01-16 ENCOUNTER — ROUTINE PRENATAL (OUTPATIENT)
Dept: OBGYN | Facility: CLINIC | Age: 29
End: 2019-01-16

## 2019-01-16 ENCOUNTER — HOSPITAL ENCOUNTER (OUTPATIENT)
Facility: MEDICAL CENTER | Age: 29
End: 2019-01-16
Attending: OBSTETRICS & GYNECOLOGY | Admitting: OBSTETRICS & GYNECOLOGY
Payer: COMMERCIAL

## 2019-01-16 VITALS — SYSTOLIC BLOOD PRESSURE: 112 MMHG | WEIGHT: 225 LBS | DIASTOLIC BLOOD PRESSURE: 64 MMHG | BODY MASS INDEX: 37.44 KG/M2

## 2019-01-16 VITALS
DIASTOLIC BLOOD PRESSURE: 75 MMHG | SYSTOLIC BLOOD PRESSURE: 135 MMHG | TEMPERATURE: 98 F | HEART RATE: 85 BPM | RESPIRATION RATE: 18 BRPM

## 2019-01-16 DIAGNOSIS — O47.00 PRETERM CONTRACTIONS: ICD-10-CM

## 2019-01-16 DIAGNOSIS — Z34.83 ENCOUNTER FOR SUPERVISION OF OTHER NORMAL PREGNANCY IN THIRD TRIMESTER: Primary | ICD-10-CM

## 2019-01-16 LAB
APPEARANCE UR: CLEAR
COLOR UR AUTO: YELLOW
FIBRONECTIN FETAL SPEC QL: NEGATIVE
GLUCOSE UR QL STRIP.AUTO: NEGATIVE MG/DL
KETONES UR QL STRIP.AUTO: NEGATIVE MG/DL
LEUKOCYTE ESTERASE UR QL STRIP.AUTO: NEGATIVE
NITRITE UR QL STRIP.AUTO: NEGATIVE
PH UR STRIP.AUTO: 5.5 [PH]
PROT UR QL STRIP: ABNORMAL MG/DL
RBC UR QL AUTO: NEGATIVE
SP GR UR: 1.02

## 2019-01-16 PROCEDURE — 90040 PR PRENATAL FOLLOW UP: CPT | Performed by: NURSE PRACTITIONER

## 2019-01-16 PROCEDURE — 59025 FETAL NON-STRESS TEST: CPT

## 2019-01-16 PROCEDURE — 81002 URINALYSIS NONAUTO W/O SCOPE: CPT

## 2019-01-16 PROCEDURE — 59025 FETAL NON-STRESS TEST: CPT | Mod: 26 | Performed by: OBSTETRICS & GYNECOLOGY

## 2019-01-16 PROCEDURE — 76819 FETAL BIOPHYS PROFIL W/O NST: CPT

## 2019-01-16 NOTE — PROGRESS NOTES
Pt here today for OB follow up  Pt states having lower back pain, and claudia peters  Reports +FM  Good # [phone]   Pharmacy Confirmed.  Chaperone offered and not indicated.  Pt given 1 hr GTT and instructions.  Pt given KRISTINA sheet and instructions.  Pt declines BTL  Pt would like to think about TDAP, ask at next visit.

## 2019-01-16 NOTE — PROGRESS NOTES
S:  Pt is  at 28w5d for routine OB follow up.  Reports occas CTX, low back and abd pain for past week.  Reports good FM.  Denies VB, LOF, RUCs or vaginal DC.    O:  Please see above vitals.        FHTs: 145        Fundal ht: 28 cm.        S=D        FFN obtained and sent        VE: //soft    A:  IUP at 28w5d  Patient Active Problem List    Diagnosis Date Noted   • Encounter for supervision of other normal pregnancy, second trimester 2018        P:  1.  Declines BTL.          2.  Instructions given on FKCs.          3.  Questions answered.          4.  Encouraged pt to tour L&D.          5.  Encourage adequate water intake.        6.   labor precautions reviewed.         7.  F/u 2 wks.        8.  TDap undecided, will ask again at next visit.        9.  To L&D for further monitoring       10. FFN sent

## 2019-01-16 NOTE — LETTER
"Count Your Baby's Movements  Another step to a healthy delivery    Tiffanie Jaffe             Dept: [phone]    How Many Weeks Pregnant? 285d    Date to Begin Countin1619              How to use this chart    One way for your physician to keep track of your baby's health is by knowing how often the baby moves (or \"kicks\") in your womb.  You can help your physician to do this by using this chart every day.    Every day, you should see how many hours it takes for your baby to move 10 times.  Start in the morning, as soon as you get up.    · First, write down the time your baby moves until you get to 10.  · Check off one box every time your baby moves until you get to 10.  · Write down the time you finished counting in the last column.  · Total how long it took to count up all 10 movements.  · Finally, fill in the box that shows how long this took.  After counting 10 movements, you no longer have to count any more that day.  The next morning, just start counting again as soon as you get up.    What should you call a \"movement\"?  It is hard to say, because it will feel different from one mother to another and from one pregnancy to the next.  The important thing is that you count the movements the same way throughout your pregnancy.  If you have more questions, you should ask your physician.    Count carefully every day!  SAMPLE:  Week 28    How many hours did it take to feel 10 movements?       Start  Time     1     2     3     4     5     6     7     8     9     10   Finish Time   Mon 8:20 ·  ·  ·  ·  ·  ·  ·  ·  ·  ·  11:40                  Sat               Sun                 IMPORTANT: You should contact your physician if it takes more than two hours for you to feel 10 movements.  Each morning, write down the time and start to count the movements of your baby.  Keep track by checking off one box every time you feel one movement.  When you have felt " "10 \"kicks\", write down the time you finished counting in the last column.  Then fill in the   box (over the check juana) for the number of hours it took.  Be sure to read the complete instructions on the previous page.            "

## 2019-01-17 NOTE — PROGRESS NOTES
1640. Pt here from [facility] for cramping. EDC of 4/5/19= 28.5 weeks. Pt denies leaking or bleeding and has +FM. Urine obtained and dipped. Pt had a FFN at the office.    1650. [facility] called about FFN, no answer, lab does not have it.    1700. Report to Dr. Charles about no uc's on monitor and missing FFN, orders recheck the pt at 1730. Pt updated on the POC.    1740. Dr. Charles in the room, [facility] FT. Order for BPP due to variable deceleration.    1900. Report to Sudarshan JOSEPH.

## 2019-01-17 NOTE — PROGRESS NOTES
Brief Progress Note:    28-year-old  001 at 20 weeks 5 days sent for the pregnancy center today for concerns about  labor.  Patient reports she does have some cramping but no organized contractions.  Denies loss of fluid, vaginal bleeding.  Endorses positive fetal movement.  At the pregnancy center was fingertip/40%.      Tiffanie Jaffe, a  at 28w5d with an HALEY of 2019, by Last Menstrual Period, was seen at [facility] for a nonstress test.    Nonstress Test  Reason for NST: Labor Evaluation  Variability: Moderate  Decelerations: Variable  Accelerations: Yes  Acoustic Stimulator: No  Baseline: 150  Uterine Irritability: No  Contractions: Not present  Nonstress Test Interpretation  $ Nonstress Test Interpretation - Fetus A: Reactive  NST Interpreted By: Abhijeet NAILS/Dr. Charles      NST 150s baseline, per my read reactive as above    Few mild variables, likely EGA appropriate but will get BPP to evaluate fluid.    Repeat SVE on my exam FT//-3  Pt feels comfortable going home. Assuming BPP is normal, will be OK for discharged.  Discussed PTL precautions, to return if VB/LOF/ctx.    Vandana Mckeon MD  [facility], Women's Health

## 2019-01-17 NOTE — PROGRESS NOTES
In to see patient, feels well  US/BPP done, score 8/8 with AJAY of 21.1cm  Patient is comfortable with discharge   labor precautions discussed  FHT's appropriate for gestation  TOCO quiet at this time.    Follow up with [facility] at next scheduled appt.    Aranza Lake CNM

## 2019-01-17 NOTE — PROGRESS NOTES
- Report received from MENDOZA Jha RN. US at bedside currently performing BPP.   - US out of room. Updated pt on POC, awaiting BPP results and orders from provider. No c/o pain or cramping, denies LOF and VB. Reports +FM.   - orders from VADIM Lake d/c pt with pre-term d/c instructions  -  d/c instructions reviewed with pt. Kick counts discussed. Pt verbalized understanding. All ?s and concerns answered. VADIM Lake also at bedside to answer ?s  - Pt ambulatory with FOB in stable condition. With all personal belongings in hand

## 2019-01-24 ENCOUNTER — HOSPITAL ENCOUNTER (OUTPATIENT)
Dept: LAB | Facility: MEDICAL CENTER | Age: 29
End: 2019-01-24
Attending: NURSE PRACTITIONER
Payer: COMMERCIAL

## 2019-01-24 DIAGNOSIS — Z34.83 ENCOUNTER FOR SUPERVISION OF OTHER NORMAL PREGNANCY IN THIRD TRIMESTER: ICD-10-CM

## 2019-01-24 LAB
GLUCOSE 1H P 50 G GLC PO SERPL-MCNC: 121 MG/DL (ref 70–139)
HCT VFR BLD AUTO: 42.2 % (ref 37–47)
HGB BLD-MCNC: 13.9 G/DL (ref 12–16)
TREPONEMA PALLIDUM IGG+IGM AB [PRESENCE] IN SERUM OR PLASMA BY IMMUNOASSAY: NON REACTIVE

## 2019-01-30 ENCOUNTER — ROUTINE PRENATAL (OUTPATIENT)
Dept: OBGYN | Facility: CLINIC | Age: 29
End: 2019-01-30

## 2019-01-30 VITALS — BODY MASS INDEX: 37.94 KG/M2 | DIASTOLIC BLOOD PRESSURE: 68 MMHG | WEIGHT: 228 LBS | SYSTOLIC BLOOD PRESSURE: 108 MMHG

## 2019-01-30 DIAGNOSIS — Z34.83 ENCOUNTER FOR SUPERVISION OF OTHER NORMAL PREGNANCY IN THIRD TRIMESTER: Primary | ICD-10-CM

## 2019-01-30 PROCEDURE — 90040 PR PRENATAL FOLLOW UP: CPT | Performed by: NURSE PRACTITIONER

## 2019-01-30 PROCEDURE — 90715 TDAP VACCINE 7 YRS/> IM: CPT | Performed by: NURSE PRACTITIONER

## 2019-01-30 PROCEDURE — 90471 IMMUNIZATION ADMIN: CPT | Performed by: NURSE PRACTITIONER

## 2019-01-30 NOTE — PROGRESS NOTES
OB f/u. + fetal movement.  No VB, LOF or UC's.  Wt: 228lb      BP:108/68  Good phone # [phone]   Preferred pharmacy confirmed.  Tdap given today  Pt. was seen in L&D 1/16/19 due to contractions.

## 2019-01-30 NOTE — PROGRESS NOTES
TDap given to patient. Left Deltoid. Screening checklist reviewed with patient. VIS given. Verified by MR

## 2019-01-30 NOTE — PROGRESS NOTES
S:  Pt is  at 30w5d for routine OB follow up.  No concerns today.  Reports good FM.  Denies VB, LOF, RUCs or vaginal DC.    O:  Please see above vitals.        FHTs: 147        Fundal ht: 30 cm.        S=D        1hr GTT: 121 -- reviewed w pt.    A:  IUP at 30w5d  Patient Active Problem List    Diagnosis Date Noted   • Encounter for supervision of other normal pregnancy, second trimester 2018        P:  1.  PP contraception: Declines BTL, unsure of BCM.        2.  Continue FKCs.          3.  Questions answered.          4.  Encouraged pt to tour L&D.          5.  Encourage adequate water intake.        6.  F/u 2 wks.        7.  Tdap today.

## 2019-02-13 ENCOUNTER — ROUTINE PRENATAL (OUTPATIENT)
Dept: OBGYN | Facility: CLINIC | Age: 29
End: 2019-02-13

## 2019-02-13 VITALS — WEIGHT: 234 LBS | BODY MASS INDEX: 38.94 KG/M2 | SYSTOLIC BLOOD PRESSURE: 120 MMHG | DIASTOLIC BLOOD PRESSURE: 70 MMHG

## 2019-02-13 DIAGNOSIS — Z34.83 ENCOUNTER FOR SUPERVISION OF OTHER NORMAL PREGNANCY, THIRD TRIMESTER: Primary | ICD-10-CM

## 2019-02-13 PROCEDURE — 90040 PR PRENATAL FOLLOW UP: CPT | Performed by: NURSE PRACTITIONER

## 2019-02-13 NOTE — PROGRESS NOTES
S) Pt is a 28 y.o.   at 32w5d  gestation. Routine prenatal care today. Pt without concerns or complaints today.    Fetal movement Normal  Cramping no  VB no  LOF no   Denies dysuria. Generally feels well today. Good self-care activities identified. Denies headaches, swelling, visual changes, or epigastric pain .     O) Blood pressure 120/70, weight 106.1 kg (234 lb), last menstrual period 2018, not currently breastfeeding.        Labs: WNL       PNL: WNL       GCT: 121       AFP: normal       GBS: N/A       Pertinent ultrasound - 18 AJAY 18.39, survey WNL, c/w previous dating           A) IUP at 32w5d       S=D         Patient Active Problem List    Diagnosis Date Noted   • Encounter for supervision of other normal pregnancy, second trimester 2018          SVE: deferred         TDAP: yes       FLU: no        BTL: no       : no       C/S Consent: no       IOL or C/S scheduled: no       LAST PAP: 10/29/18 negative         P) s/s ptl vs general discomforts. Fetal movements reviewed. General ed and anticipatory guidance. Nutrition/exercise/vitamin. Plans breast Plans pp contraception- unsure   RTC 2 weeks or PRN

## 2019-02-13 NOTE — PROGRESS NOTES
OB f/u. + fetal movement.  No VB, LOF or UC's.  Wt:234lb       BP:120/70  Good phone # [phone]  Preferred pharmacy confirmed.

## 2019-02-27 ENCOUNTER — ROUTINE PRENATAL (OUTPATIENT)
Dept: OBGYN | Facility: CLINIC | Age: 29
End: 2019-02-27

## 2019-02-27 VITALS — BODY MASS INDEX: 38.44 KG/M2 | SYSTOLIC BLOOD PRESSURE: 122 MMHG | WEIGHT: 231 LBS | DIASTOLIC BLOOD PRESSURE: 76 MMHG

## 2019-02-27 DIAGNOSIS — Z34.82 ENCOUNTER FOR SUPERVISION OF OTHER NORMAL PREGNANCY, SECOND TRIMESTER: Primary | ICD-10-CM

## 2019-02-27 PROCEDURE — 90040 PR PRENATAL FOLLOW UP: CPT | Performed by: NURSE PRACTITIONER

## 2019-02-27 NOTE — PATIENT INSTRUCTIONS
P:  1.  GBS @ 35 wks.          2.  Continue FKCs.          3.  Questions answered.          4.  Encouraged pt to tour L&D.          5.  Encourage adequate water intake.        6.  F/u 1 wks.

## 2019-02-27 NOTE — PROGRESS NOTES
S:  Pt is  at 34w5d for routine OB follow up.  Reports some tingling in her hands in the morning.  Also an occ BH UC.  Reports good FM.  Denies VB, LOF, RUCs or vaginal DC.    O:  Please see above vitals.        FHTs: 155        Fundal ht: 34 cm.    A:  IUP at 34w5d  Patient Active Problem List    Diagnosis Date Noted   • Encounter for supervision of other normal pregnancy, second trimester 2018        P:  1.  GBS @ 35 wks.          2.  Continue FKCs.          3.  Questions answered.          4.  Encouraged pt to tour L&D.          5.  Encourage adequate water intake.        6.  F/u 1 wks.

## 2019-02-27 NOTE — PROGRESS NOTES
Pt. Here for OB/FU. Reports Good FM.   Good # [phone]  Pt states having [place] Valera and tingling of hands.   Pharmacy verified.

## 2019-03-06 ENCOUNTER — HOSPITAL ENCOUNTER (OUTPATIENT)
Facility: MEDICAL CENTER | Age: 29
End: 2019-03-06
Attending: PHYSICIAN ASSISTANT

## 2019-03-06 ENCOUNTER — ROUTINE PRENATAL (OUTPATIENT)
Dept: OBGYN | Facility: CLINIC | Age: 29
End: 2019-03-06

## 2019-03-06 VITALS — BODY MASS INDEX: 38.44 KG/M2 | DIASTOLIC BLOOD PRESSURE: 80 MMHG | WEIGHT: 231 LBS | SYSTOLIC BLOOD PRESSURE: 124 MMHG

## 2019-03-06 DIAGNOSIS — O09.899 RUBELLA NON-IMMUNE STATUS, ANTEPARTUM: ICD-10-CM

## 2019-03-06 DIAGNOSIS — Z28.39 RUBELLA NON-IMMUNE STATUS, ANTEPARTUM: ICD-10-CM

## 2019-03-06 DIAGNOSIS — Z34.82 ENCOUNTER FOR SUPERVISION OF OTHER NORMAL PREGNANCY, SECOND TRIMESTER: ICD-10-CM

## 2019-03-06 PROCEDURE — 87150 DNA/RNA AMPLIFIED PROBE: CPT

## 2019-03-06 PROCEDURE — 90040 PR PRENATAL FOLLOW UP: CPT | Performed by: PHYSICIAN ASSISTANT

## 2019-03-06 PROCEDURE — 87081 CULTURE SCREEN ONLY: CPT

## 2019-03-06 NOTE — PROGRESS NOTES
Pt. here for Ob f/u and GBS today. Good # [phone]  Good FM  Pt states having [place] Valera.   Pharmacy verified.   Chaperone offered not needed.

## 2019-03-06 NOTE — PROGRESS NOTES
Pt has no complaints with cramping, regular or strong UCs, Vb, LOF, though pt has occ tightening only. +FM. GBS done today. Epidural d/w pt in detail. Labor precautions reviewed. Daily FKC and walks recommended. Pt having a lot of stress in life as her 94 yr old grandmother is in hospital - pt dealing well. Cervix: 1/50/-3, post, soft, vtx. RTC 1 wk or sooner prn.

## 2019-03-08 LAB — GP B STREP DNA SPEC QL NAA+PROBE: NEGATIVE

## 2019-03-13 ENCOUNTER — ROUTINE PRENATAL (OUTPATIENT)
Dept: OBGYN | Facility: CLINIC | Age: 29
End: 2019-03-13

## 2019-03-13 VITALS — DIASTOLIC BLOOD PRESSURE: 78 MMHG | WEIGHT: 235 LBS | BODY MASS INDEX: 39.11 KG/M2 | SYSTOLIC BLOOD PRESSURE: 120 MMHG

## 2019-03-13 DIAGNOSIS — Z34.82 ENCOUNTER FOR SUPERVISION OF OTHER NORMAL PREGNANCY, SECOND TRIMESTER: ICD-10-CM

## 2019-03-13 PROCEDURE — 90040 PR PRENATAL FOLLOW UP: CPT | Performed by: NURSE PRACTITIONER

## 2019-03-13 NOTE — PROGRESS NOTES
SUBJECTIVE:  Pt is a 28 y.o.   at 36w5d  gestation. Presents today for follow-up prenatal care. Reports no issues at this time.  Reports good fetal movement. Denies cramping/contractions, bleeding or leaking of fluid. Denies dysuria, headaches, N/V, or other issues at this time. Generally feels well today.     OBJECTIVE:  - See prenatal vitals flow  -   Vitals:    19 0853   BP: 120/78   Weight: 106.6 kg (235 lb)                 ASSESSMENT:   - IUP at 36w5d    - S=D   -   Patient Active Problem List    Diagnosis Date Noted   • Rubella non-immune status, antepartum 2019   • Encounter for supervision of other normal pregnancy, second trimester 2018         PLAN:  - S/sx pregnancy and labor warning signs vs general discomforts discussed  - Fetal movements and kick counts reviewed   - Adequate hydration reinforced  - Nutrition/exercise/vitamin education; continued PNV  - Plans for breastfeeding  - Plans fertility awareness method for contraception Pp  - Coping with labor pains reviewed and resources provided   - S/p TDAP vacc   - Encouraged nightly walks   - Anticipatory guidance given  - RTC in 1 weeks for follow-up prenatal care

## 2019-03-13 NOTE — PROGRESS NOTES
OB f/u. + fetal movement.  No VB, LOF or UC's.  Wt: 235lb      BP:120/78  Good phone # [phone]  Preferred pharmacy confirmed.  GBS negative

## 2019-03-20 ENCOUNTER — ROUTINE PRENATAL (OUTPATIENT)
Dept: OBGYN | Facility: CLINIC | Age: 29
End: 2019-03-20

## 2019-03-20 VITALS — BODY MASS INDEX: 39.44 KG/M2 | WEIGHT: 237 LBS | SYSTOLIC BLOOD PRESSURE: 118 MMHG | DIASTOLIC BLOOD PRESSURE: 68 MMHG

## 2019-03-20 DIAGNOSIS — Z34.83 ENCOUNTER FOR SUPERVISION OF OTHER NORMAL PREGNANCY, THIRD TRIMESTER: ICD-10-CM

## 2019-03-20 PROCEDURE — 90040 PR PRENATAL FOLLOW UP: CPT | Performed by: NURSE PRACTITIONER

## 2019-03-20 NOTE — PROGRESS NOTES
SUBJECTIVE:  Pt is a 28 y.o.   at 37w5d  gestation. Presents today for follow-up prenatal care. Reports no issues at this time.  Reports feeling good  fetal movement. Denies cramping/contractions, bleeding or leaking of fluid. Denies dysuria, headaches, N/V. Generally feels well today. Recent ER or L&D visits - none.     OBJECTIVE:  - See prenatal vitals flow  -   Vitals:    19 0913   BP: 118/68   Weight: 107.5 kg (237 lb)      Fundal Height - 38  FHT - 145  US normal fetal survey  Prenatal labs normal prenatal panel, normal glucose. Rubella nonimmune. GBS neg.               ASSESSMENT:   - IUP at 37w5d    - S=D   -   Patient Active Problem List    Diagnosis Date Noted   • Encounter for supervision of other normal pregnancy, second trimester 2018     Priority: High   • Rubella non-immune status, antepartum 2019     Priority: Medium         PLAN:  - S/sx pregnancy and labor warning signs vs general discomforts discussed  - Fetal movements and kick counts reviewed   - Adequate hydration reinforced  - Nutrition/exercise/vitamin education; continued PNV  - patient is currently unsure about contraception.

## 2019-03-28 ENCOUNTER — ROUTINE PRENATAL (OUTPATIENT)
Dept: OBGYN | Facility: CLINIC | Age: 29
End: 2019-03-28

## 2019-03-28 VITALS — SYSTOLIC BLOOD PRESSURE: 120 MMHG | DIASTOLIC BLOOD PRESSURE: 76 MMHG | WEIGHT: 239 LBS | BODY MASS INDEX: 39.77 KG/M2

## 2019-03-28 DIAGNOSIS — Z34.82 ENCOUNTER FOR SUPERVISION OF OTHER NORMAL PREGNANCY, SECOND TRIMESTER: ICD-10-CM

## 2019-03-28 PROCEDURE — 90040 PR PRENATAL FOLLOW UP: CPT | Performed by: PHYSICIAN ASSISTANT

## 2019-03-28 NOTE — PROGRESS NOTES
OB f/u. + fetal movement.  No VB, LOF or UC's.  Wt:239lb       BP: 12/76  Good phone # [phone]  Preferred pharmacy confirmed.  GBS negative  Needs IOL order

## 2019-03-28 NOTE — PROGRESS NOTES
Pt has no complaints with cramping, regular or strong UCs, VB, LOF. +FM. GBS neg. IOL referral sent today for 41 wk. Labor precautions reviewed. Daily FKC and walks recommended. 1/th/-3, mid, soft, vtx. RTC 1 wk or sooner prn.

## 2019-04-03 ENCOUNTER — ROUTINE PRENATAL (OUTPATIENT)
Dept: OBGYN | Facility: CLINIC | Age: 29
End: 2019-04-03

## 2019-04-03 VITALS — BODY MASS INDEX: 39.77 KG/M2 | DIASTOLIC BLOOD PRESSURE: 74 MMHG | WEIGHT: 239 LBS | SYSTOLIC BLOOD PRESSURE: 118 MMHG

## 2019-04-03 DIAGNOSIS — Z34.82 ENCOUNTER FOR SUPERVISION OF OTHER NORMAL PREGNANCY, SECOND TRIMESTER: ICD-10-CM

## 2019-04-03 PROCEDURE — 90040 PR PRENATAL FOLLOW UP: CPT | Performed by: PHYSICIAN ASSISTANT

## 2019-04-03 NOTE — PROGRESS NOTES
"Pt has no complaints with cramping, regular or strong UCs, Vb, LOF.+FM. Pt is tearful as she is \"just done.\" IOL scheduled for 4/6 for OP gel, then 4/7 for IOL - confirmed with Gwendolyn at L&D today. Labor precautions reviewed. Daily FKC recommended. Cervix: 1/th/-3, post, soft, vtx. RTC 1 wk or sooner prn.   "

## 2019-04-03 NOTE — PROGRESS NOTES
Pt here today for OB follow up  Pt states no complaints   Reports +FM  Good # [phone]  Pharmacy Confirmed.  Chaperone offered and declined.   Pt scheduled for GEL 4/10/19 at 2000   IOL scheduled for 4/11/19 at 0800.

## 2019-04-06 ENCOUNTER — APPOINTMENT (OUTPATIENT)
Dept: OBGYN | Facility: MEDICAL CENTER | Age: 29
End: 2019-04-06
Attending: OBSTETRICS & GYNECOLOGY

## 2019-04-06 ENCOUNTER — HOSPITAL ENCOUNTER (OUTPATIENT)
Facility: MEDICAL CENTER | Age: 29
End: 2019-04-06
Attending: OBSTETRICS & GYNECOLOGY | Admitting: OBSTETRICS & GYNECOLOGY

## 2019-04-06 VITALS
SYSTOLIC BLOOD PRESSURE: 128 MMHG | HEIGHT: 65 IN | TEMPERATURE: 98.2 F | BODY MASS INDEX: 39.49 KG/M2 | HEART RATE: 96 BPM | DIASTOLIC BLOOD PRESSURE: 83 MMHG | WEIGHT: 237 LBS

## 2019-04-06 PROCEDURE — 59025 FETAL NON-STRESS TEST: CPT

## 2019-04-06 PROCEDURE — 700101 HCHG RX REV CODE 250: Performed by: PHYSICIAN ASSISTANT

## 2019-04-06 RX ADMIN — DINOPROSTONE 5 MG: 20 SUPPOSITORY VAGINAL at 21:45

## 2019-04-07 ENCOUNTER — ANESTHESIA EVENT (OUTPATIENT)
Dept: OBGYN | Facility: MEDICAL CENTER | Age: 29
End: 2019-04-07
Payer: COMMERCIAL

## 2019-04-07 ENCOUNTER — HOSPITAL ENCOUNTER (INPATIENT)
Facility: MEDICAL CENTER | Age: 29
LOS: 1 days | End: 2019-04-08
Attending: OBSTETRICS & GYNECOLOGY | Admitting: OBSTETRICS & GYNECOLOGY
Payer: COMMERCIAL

## 2019-04-07 ENCOUNTER — APPOINTMENT (OUTPATIENT)
Dept: OBGYN | Facility: MEDICAL CENTER | Age: 29
End: 2019-04-07
Attending: OBSTETRICS & GYNECOLOGY
Payer: COMMERCIAL

## 2019-04-07 ENCOUNTER — ANESTHESIA (OUTPATIENT)
Dept: OBGYN | Facility: MEDICAL CENTER | Age: 29
End: 2019-04-07
Payer: COMMERCIAL

## 2019-04-07 LAB
BASOPHILS # BLD AUTO: 0.2 % (ref 0–1.8)
BASOPHILS # BLD: 0.03 K/UL (ref 0–0.12)
EOSINOPHIL # BLD AUTO: 0.04 K/UL (ref 0–0.51)
EOSINOPHIL NFR BLD: 0.3 % (ref 0–6.9)
ERYTHROCYTE [DISTWIDTH] IN BLOOD BY AUTOMATED COUNT: 44.6 FL (ref 35.9–50)
ERYTHROCYTE [DISTWIDTH] IN BLOOD BY AUTOMATED COUNT: 45.5 FL (ref 35.9–50)
HCT VFR BLD AUTO: 40.1 % (ref 37–47)
HCT VFR BLD AUTO: 44.4 % (ref 37–47)
HGB BLD-MCNC: 13.6 G/DL (ref 12–16)
HGB BLD-MCNC: 14.8 G/DL (ref 12–16)
HOLDING TUBE BB 8507: NORMAL
IMM GRANULOCYTES # BLD AUTO: 0.04 K/UL (ref 0–0.11)
IMM GRANULOCYTES NFR BLD AUTO: 0.3 % (ref 0–0.9)
LYMPHOCYTES # BLD AUTO: 2.65 K/UL (ref 1–4.8)
LYMPHOCYTES NFR BLD: 20.6 % (ref 22–41)
MCH RBC QN AUTO: 29.6 PG (ref 27–33)
MCH RBC QN AUTO: 29.7 PG (ref 27–33)
MCHC RBC AUTO-ENTMCNC: 33.3 G/DL (ref 33.6–35)
MCHC RBC AUTO-ENTMCNC: 33.9 G/DL (ref 33.6–35)
MCV RBC AUTO: 87.4 FL (ref 81.4–97.8)
MCV RBC AUTO: 89 FL (ref 81.4–97.8)
MONOCYTES # BLD AUTO: 0.86 K/UL (ref 0–0.85)
MONOCYTES NFR BLD AUTO: 6.7 % (ref 0–13.4)
NEUTROPHILS # BLD AUTO: 9.27 K/UL (ref 2–7.15)
NEUTROPHILS NFR BLD: 71.9 % (ref 44–72)
NRBC # BLD AUTO: 0 K/UL
NRBC BLD-RTO: 0 /100 WBC
PLATELET # BLD AUTO: 230 K/UL (ref 164–446)
PLATELET # BLD AUTO: 276 K/UL (ref 164–446)
PMV BLD AUTO: 9.2 FL (ref 9–12.9)
PMV BLD AUTO: 9.3 FL (ref 9–12.9)
RBC # BLD AUTO: 4.59 M/UL (ref 4.2–5.4)
RBC # BLD AUTO: 4.99 M/UL (ref 4.2–5.4)
WBC # BLD AUTO: 12.9 K/UL (ref 4.8–10.8)
WBC # BLD AUTO: 18.9 K/UL (ref 4.8–10.8)

## 2019-04-07 PROCEDURE — 700111 HCHG RX REV CODE 636 W/ 250 OVERRIDE (IP): Performed by: PHYSICIAN ASSISTANT

## 2019-04-07 PROCEDURE — 90707 MMR VACCINE SC: CPT | Performed by: OBSTETRICS & GYNECOLOGY

## 2019-04-07 PROCEDURE — 85027 COMPLETE CBC AUTOMATED: CPT

## 2019-04-07 PROCEDURE — A9270 NON-COVERED ITEM OR SERVICE: HCPCS | Performed by: PHYSICIAN ASSISTANT

## 2019-04-07 PROCEDURE — 700111 HCHG RX REV CODE 636 W/ 250 OVERRIDE (IP)

## 2019-04-07 PROCEDURE — 3E0234Z INTRODUCTION OF SERUM, TOXOID AND VACCINE INTO MUSCLE, PERCUTANEOUS APPROACH: ICD-10-PCS | Performed by: OBSTETRICS & GYNECOLOGY

## 2019-04-07 PROCEDURE — 700105 HCHG RX REV CODE 258: Performed by: PHYSICIAN ASSISTANT

## 2019-04-07 PROCEDURE — 700111 HCHG RX REV CODE 636 W/ 250 OVERRIDE (IP): Performed by: OBSTETRICS & GYNECOLOGY

## 2019-04-07 PROCEDURE — 36415 COLL VENOUS BLD VENIPUNCTURE: CPT

## 2019-04-07 PROCEDURE — 770002 HCHG ROOM/CARE - OB PRIVATE (112)

## 2019-04-07 PROCEDURE — 59409 OBSTETRICAL CARE: CPT | Performed by: PHYSICIAN ASSISTANT

## 2019-04-07 PROCEDURE — 700112 HCHG RX REV CODE 229: Performed by: PHYSICIAN ASSISTANT

## 2019-04-07 PROCEDURE — 700102 HCHG RX REV CODE 250 W/ 637 OVERRIDE(OP): Performed by: PHYSICIAN ASSISTANT

## 2019-04-07 PROCEDURE — 90471 IMMUNIZATION ADMIN: CPT

## 2019-04-07 PROCEDURE — 304965 HCHG RECOVERY SERVICES

## 2019-04-07 PROCEDURE — 59409 OBSTETRICAL CARE: CPT

## 2019-04-07 PROCEDURE — 85025 COMPLETE CBC W/AUTO DIFF WBC: CPT

## 2019-04-07 PROCEDURE — 700111 HCHG RX REV CODE 636 W/ 250 OVERRIDE (IP): Performed by: ANESTHESIOLOGY

## 2019-04-07 PROCEDURE — 303615 HCHG EPIDURAL/SPINAL ANESTHESIA FOR LABOR

## 2019-04-07 RX ORDER — OXYCODONE HYDROCHLORIDE AND ACETAMINOPHEN 5; 325 MG/1; MG/1
2 TABLET ORAL EVERY 4 HOURS PRN
Status: DISCONTINUED | OUTPATIENT
Start: 2019-04-07 | End: 2019-04-08 | Stop reason: HOSPADM

## 2019-04-07 RX ORDER — SODIUM CHLORIDE, SODIUM LACTATE, POTASSIUM CHLORIDE, CALCIUM CHLORIDE 600; 310; 30; 20 MG/100ML; MG/100ML; MG/100ML; MG/100ML
INJECTION, SOLUTION INTRAVENOUS CONTINUOUS
Status: DISCONTINUED | OUTPATIENT
Start: 2019-04-07 | End: 2019-04-07

## 2019-04-07 RX ORDER — ROPIVACAINE HYDROCHLORIDE 2 MG/ML
INJECTION, SOLUTION EPIDURAL; INFILTRATION; PERINEURAL
Status: COMPLETED
Start: 2019-04-07 | End: 2019-04-07

## 2019-04-07 RX ORDER — BUPIVACAINE HYDROCHLORIDE 2.5 MG/ML
INJECTION, SOLUTION EPIDURAL; INFILTRATION; INTRACAUDAL
Status: COMPLETED
Start: 2019-04-07 | End: 2019-04-07

## 2019-04-07 RX ORDER — MISOPROSTOL 200 UG/1
600 TABLET ORAL
Status: DISCONTINUED | OUTPATIENT
Start: 2019-04-07 | End: 2019-04-08 | Stop reason: HOSPADM

## 2019-04-07 RX ORDER — IBUPROFEN 600 MG/1
600 TABLET ORAL EVERY 6 HOURS PRN
Status: DISCONTINUED | OUTPATIENT
Start: 2019-04-07 | End: 2019-04-08 | Stop reason: HOSPADM

## 2019-04-07 RX ORDER — VITAMIN A ACETATE, BETA CAROTENE, ASCORBIC ACID, CHOLECALCIFEROL, .ALPHA.-TOCOPHEROL ACETATE, DL-, THIAMINE MONONITRATE, RIBOFLAVIN, NIACINAMIDE, PYRIDOXINE HYDROCHLORIDE, FOLIC ACID, CYANOCOBALAMIN, CALCIUM CARBONATE, FERROUS FUMARATE, ZINC OXIDE, CUPRIC OXIDE 3080; 12; 120; 400; 1; 1.84; 3; 20; 22; 920; 25; 200; 27; 10; 2 [IU]/1; UG/1; MG/1; [IU]/1; MG/1; MG/1; MG/1; MG/1; MG/1; [IU]/1; MG/1; MG/1; MG/1; MG/1; MG/1
1 TABLET, FILM COATED ORAL EVERY MORNING
Status: DISCONTINUED | OUTPATIENT
Start: 2019-04-07 | End: 2019-04-08 | Stop reason: HOSPADM

## 2019-04-07 RX ORDER — DEXTROSE, SODIUM CHLORIDE, SODIUM LACTATE, POTASSIUM CHLORIDE, AND CALCIUM CHLORIDE 5; .6; .31; .03; .02 G/100ML; G/100ML; G/100ML; G/100ML; G/100ML
INJECTION, SOLUTION INTRAVENOUS CONTINUOUS
Status: DISCONTINUED | OUTPATIENT
Start: 2019-04-07 | End: 2019-04-07

## 2019-04-07 RX ORDER — DOCUSATE SODIUM 100 MG/1
100 CAPSULE, LIQUID FILLED ORAL 2 TIMES DAILY PRN
Status: DISCONTINUED | OUTPATIENT
Start: 2019-04-07 | End: 2019-04-08 | Stop reason: HOSPADM

## 2019-04-07 RX ORDER — ALUMINA, MAGNESIA, AND SIMETHICONE 2400; 2400; 240 MG/30ML; MG/30ML; MG/30ML
30 SUSPENSION ORAL EVERY 6 HOURS PRN
Status: DISCONTINUED | OUTPATIENT
Start: 2019-04-07 | End: 2019-04-07 | Stop reason: HOSPADM

## 2019-04-07 RX ORDER — MISOPROSTOL 200 UG/1
800 TABLET ORAL
Status: DISCONTINUED | OUTPATIENT
Start: 2019-04-07 | End: 2019-04-07 | Stop reason: HOSPADM

## 2019-04-07 RX ORDER — ONDANSETRON 4 MG/1
4 TABLET, ORALLY DISINTEGRATING ORAL EVERY 6 HOURS PRN
Status: DISCONTINUED | OUTPATIENT
Start: 2019-04-07 | End: 2019-04-08 | Stop reason: HOSPADM

## 2019-04-07 RX ORDER — BUPIVACAINE HYDROCHLORIDE 2.5 MG/ML
INJECTION, SOLUTION EPIDURAL; INFILTRATION; INTRACAUDAL PRN
Status: DISCONTINUED | OUTPATIENT
Start: 2019-04-07 | End: 2019-04-07 | Stop reason: SURG

## 2019-04-07 RX ORDER — ONDANSETRON 2 MG/ML
4 INJECTION INTRAMUSCULAR; INTRAVENOUS EVERY 6 HOURS PRN
Status: DISCONTINUED | OUTPATIENT
Start: 2019-04-07 | End: 2019-04-08 | Stop reason: HOSPADM

## 2019-04-07 RX ORDER — SODIUM CHLORIDE, SODIUM LACTATE, POTASSIUM CHLORIDE, CALCIUM CHLORIDE 600; 310; 30; 20 MG/100ML; MG/100ML; MG/100ML; MG/100ML
INJECTION, SOLUTION INTRAVENOUS PRN
Status: DISCONTINUED | OUTPATIENT
Start: 2019-04-07 | End: 2019-04-08 | Stop reason: HOSPADM

## 2019-04-07 RX ORDER — OXYCODONE HYDROCHLORIDE AND ACETAMINOPHEN 5; 325 MG/1; MG/1
1 TABLET ORAL EVERY 4 HOURS PRN
Status: DISCONTINUED | OUTPATIENT
Start: 2019-04-07 | End: 2019-04-08 | Stop reason: HOSPADM

## 2019-04-07 RX ADMIN — Medication 1 TABLET: at 11:30

## 2019-04-07 RX ADMIN — ROPIVACAINE HYDROCHLORIDE 100 ML: 2 INJECTION, SOLUTION EPIDURAL; INFILTRATION at 02:33

## 2019-04-07 RX ADMIN — BUPIVACAINE HYDROCHLORIDE 8 ML: 2.5 INJECTION, SOLUTION EPIDURAL; INFILTRATION; INTRACAUDAL; PERINEURAL at 02:43

## 2019-04-07 RX ADMIN — DOCUSATE SODIUM 100 MG: 100 CAPSULE, LIQUID FILLED ORAL at 11:29

## 2019-04-07 RX ADMIN — SODIUM CHLORIDE, POTASSIUM CHLORIDE, SODIUM LACTATE AND CALCIUM CHLORIDE: 600; 310; 30; 20 INJECTION, SOLUTION INTRAVENOUS at 01:40

## 2019-04-07 RX ADMIN — OXYCODONE HYDROCHLORIDE AND ACETAMINOPHEN 1 TABLET: 5; 325 TABLET ORAL at 11:29

## 2019-04-07 RX ADMIN — MEASLES, MUMPS, AND RUBELLA VIRUS VACCINE LIVE 0.5 ML: 1000; 12500; 1000 INJECTION, POWDER, LYOPHILIZED, FOR SUSPENSION SUBCUTANEOUS at 22:30

## 2019-04-07 RX ADMIN — IBUPROFEN 600 MG: 600 TABLET ORAL at 09:12

## 2019-04-07 RX ADMIN — FENTANYL CITRATE 100 MCG: 50 INJECTION INTRAMUSCULAR; INTRAVENOUS at 02:05

## 2019-04-07 RX ADMIN — OXYCODONE HYDROCHLORIDE AND ACETAMINOPHEN 1 TABLET: 5; 325 TABLET ORAL at 21:23

## 2019-04-07 RX ADMIN — Medication 125 ML/HR: at 05:25

## 2019-04-07 RX ADMIN — SODIUM CHLORIDE, POTASSIUM CHLORIDE, SODIUM LACTATE AND CALCIUM CHLORIDE: 600; 310; 30; 20 INJECTION, SOLUTION INTRAVENOUS at 02:09

## 2019-04-07 RX ADMIN — IBUPROFEN 600 MG: 600 TABLET ORAL at 21:24

## 2019-04-07 RX ADMIN — Medication 2000 ML/HR: at 04:47

## 2019-04-07 ASSESSMENT — EDINBURGH POSTNATAL DEPRESSION SCALE (EPDS)
I HAVE FELT SAD OR MISERABLE: NO, NOT AT ALL
I HAVE BEEN ABLE TO LAUGH AND SEE THE FUNNY SIDE OF THINGS: AS MUCH AS I ALWAYS COULD
I HAVE LOOKED FORWARD WITH ENJOYMENT TO THINGS: AS MUCH AS I EVER DID
I HAVE BEEN SO UNHAPPY THAT I HAVE HAD DIFFICULTY SLEEPING: NOT AT ALL
I HAVE BLAMED MYSELF UNNECESSARILY WHEN THINGS WENT WRONG: YES, SOME OF THE TIME
I HAVE BEEN ANXIOUS OR WORRIED FOR NO GOOD REASON: HARDLY EVER
THE THOUGHT OF HARMING MYSELF HAS OCCURRED TO ME: NEVER
THINGS HAVE BEEN GETTING ON TOP OF ME: NO, I HAVE BEEN COPING AS WELL AS EVER
I HAVE BEEN SO UNHAPPY THAT I HAVE BEEN CRYING: NO, NEVER
I HAVE FELT SCARED OR PANICKY FOR NO GOOD REASON: NO, NOT MUCH

## 2019-04-07 ASSESSMENT — COPD QUESTIONNAIRES
DURING THE PAST 4 WEEKS HOW MUCH DID YOU FEEL SHORT OF BREATH: NONE/LITTLE OF THE TIME
DO YOU EVER COUGH UP ANY MUCUS OR PHLEGM?: NO/ONLY WITH OCCASIONAL COLDS OR INFECTIONS
HAVE YOU SMOKED AT LEAST 100 CIGARETTES IN YOUR ENTIRE LIFE: NO/DON'T KNOW
IN THE PAST 12 MONTHS DO YOU DO LESS THAN YOU USED TO BECAUSE OF YOUR BREATHING PROBLEMS: DISAGREE/UNSURE

## 2019-04-07 ASSESSMENT — PATIENT HEALTH QUESTIONNAIRE - PHQ9
1. LITTLE INTEREST OR PLEASURE IN DOING THINGS: NOT AT ALL
SUM OF ALL RESPONSES TO PHQ9 QUESTIONS 1 AND 2: 0
2. FEELING DOWN, DEPRESSED, IRRITABLE, OR HOPELESS: NOT AT ALL

## 2019-04-07 ASSESSMENT — LIFESTYLE VARIABLES
EVER_SMOKED: NEVER
ALCOHOL_USE: NO

## 2019-04-07 NOTE — CONSULTS
Met with parents to introduce inpatient lactation services. Mom states that baby nursed well after delivery but has been sleepy and would not wakeup after three hours for his feeding despite holding skin to skin and trying to stimulate baby.    Reviewed normal feeding patterns in first 24 hours and mom shown page 37 in the New Beginnings Booklet.    Mom will continue to attempt feeds every 3 hours and is encouraged to call for assistance. Mom nursed her now 6 year old daughter.

## 2019-04-07 NOTE — ANESTHESIA POSTPROCEDURE EVALUATION
Patient: Tiffanie Jaffe    Procedure Summary     Date:  04/07/19 Room / Location:      Anesthesia Start:  0228 Anesthesia Stop:  0444    Procedure:  Labor Epidural Diagnosis:      Scheduled Providers:   Responsible Provider:  Rickey Nash M.D.    Anesthesia Type:  epidural ASA Status:  2          Final Anesthesia Type: epidural  Last vitals  BP   Blood Pressure: 129/67    Temp   37 °C (98.6 °F)    Pulse   Pulse: 88   Resp   20    SpO2   94 %      Anesthesia Post Evaluation    Patient location during evaluation: PACU  Patient participation: complete - patient participated  Level of consciousness: awake and alert    Airway patency: patent  Anesthetic complications: no  Cardiovascular status: hemodynamically stable  Respiratory status: acceptable  Hydration status: euvolemic    PONV: none

## 2019-04-07 NOTE — DISCHARGE INSTRUCTIONS
Dinoprostone, Prostaglandin E2 cervical gel  What is this medicine?  DINOPROSTONE (dye lisha PROST one) is used to help dilate the cervix in pregnant women who are at or near term. This will help to induce labor.  This medicine may be used for other purposes; ask your health care provider or pharmacist if you have questions.  COMMON BRAND NAME(S): Prepidil  What should I tell my health care provider before I take this medicine?  They need to know if you have any of the following conditions:  -active genital herpes infection  -glaucoma  -kidney disease  -liver disease  -lung or breathing disease, like asthma  -placenta previa  -previous pregnancy with difficult labor, traumatic delivery or  section  -previous surgery to the uterus  -uterine rupture or scars  -vaginal bleeding  -vaginal, uterine, or pelvic inflammation or infection  -6 or more previous pregnancies  -an unusual or allergic reaction to dinoprostone, prostaglandins, other medicines, foods, dyes, or preservatives  -breast-feeding  How should I use this medicine?  This medicine is inserted into the vagina by a health-care professional in a hospital or clinic setting. Remain lying down for 15 to 30 minutes after it is inserted. Your doctor will stop this medicine when the proper response occurs or when active labor begins.  Talk to your pediatrician regarding the use of this medicine in children. Special care may be needed.  Overdosage: If you think you have taken too much of this medicine contact a poison control center or emergency room at once.  NOTE: This medicine is only for you. Do not share this medicine with others.  What if I miss a dose?  This does not apply.  What may interact with this medicine?  -oxytocin  This list may not describe all possible interactions. Give your health care provider a list of all the medicines, herbs, non-prescription drugs, or dietary supplements you use. Also tell them if you smoke, drink alcohol, or use illegal  drugs. Some items may interact with your medicine.  What should I watch for while using this medicine?  You will be closely monitored while you receive this medicine.  Contact your doctor or health care professional immediately if you get signs and symptoms of infection or other complications, such as pelvic or lower abdominal pain, an unpleasant vaginal discharge, fever, chills, or increase in vaginal bleeding several days after treatment.  What side effects may I notice from receiving this medicine?  Side effects that you should report to your doctor or health care professional as soon as possible:  -allergic reactions like skin rash, itching or hives, swelling of the face, lips, or tongue  -breathing problems  -chest pain  -dizziness or fainting  -fever of 100.4 degrees F (38 degrees C) or higher  -unusual or foul-smelling vaginal discharge  -unusual vaginal bleeding, pelvic pain, or cramping  Side effects that usually do not require medical attention (report to your doctor or health care professional if they continue or are bothersome):  -diarrhea  -headache  -mild increase in body temperature that goes away  -nausea, vomiting  This list may not describe all possible side effects. Call your doctor for medical advice about side effects. You may report side effects to FDA at 2-286-FDA-8772.  Where should I keep my medicine?  This drug is given in a hospital or clinic and will not be stored at home.  NOTE: This sheet is a summary. It may not cover all possible information. If you have questions about this medicine, talk to your doctor, pharmacist, or health care provider.  © 2018 Elsevier/Gold Standard (2017-07-10 17:22:00)

## 2019-04-07 NOTE — ANESTHESIA QCDR
2019 [facility] Clinical Data Registry (for Quality Improvement)     Postoperative nausea/vomiting risk protocol (Adult = 18 yrs and Pediatric 3-17 yrs)- (430 and 463)  General inhalation anesthetic (NOT TIVA) with PONV risk factors: No  Provision of anti-emetic therapy with at least 2 different classes of agents: N/A  Patient DID NOT receive anti-emetic therapy and reason is documented in Medical Record: N/A    Multimodal Pain Management- (AQI59)  Patient undergoing Elective Surgery (i.e. Outpatient, or ASC, or Prescheduled Surgery prior to Hospital Admission): No  Use of Multimodal Pain Management, two or more drugs and/or interventions, NOT including systemic opioids: N/A  Exception: Documented allergy to multiple classes of analgesics: N/A    PACU assessment of acute postoperative pain prior to Anesthesia Care End- Applies to Patients Age = 18- (ABG7)  Initial PACU pain score is which of the following: < 7/10  Patient unable to report pain score: N/A    Post-anesthetic transfer of care checklist/protocol to PACU/ICU- (426 and 427)  Upon conclusion of case, patient transferred to which of the following locations: PACU/Non-ICU  Use of transfer checklist/protocol: Yes  Exclusion: Service Performed in Patient Hospital Room (and thus did not require transfer): N/A    PACU Reintubation- (AQI31)  General anesthesia requiring endotracheal intubation (ETT) along with subsequent extubation in OR or PACU: No  Required reintubation in the PACU: N/A  Extubation was a planned trial documented in the medical record prior to removal of the original airway device: N/A    Unplanned admission to ICU related to anesthesia service up through end of PACU care- (MD51)  Unplanned admission to ICU (not initially anticipated at anesthesia start time): No

## 2019-04-07 NOTE — ANESTHESIA TIME REPORT
Anesthesia Start and Stop Event Times     Date Time Event    4/7/2019 0228 Anesthesia Start     0444 Anesthesia Stop        Responsible Staff  04/07/19    Name Role Begin End    Rickey Nash M.D. Anesth 0228 0444        Preop Diagnosis (Free Text):  Pre-op Diagnosis             Preop Diagnosis (Codes):    Post op Diagnosis  Labor and delivery, indication for care      Premium Reason  E. Weekend    Comments:

## 2019-04-07 NOTE — H&P
History and Physical    Tiffanie Jaffe is a 28 y.o. female  -Para:     Gestational Age:  40w2d  Admitted for:   Active Labor  Admitted to  [facility].  [facility]    HPI: Patient is admitted with the above mentioned Chief Complaint and States   Loss of fluid:   negative  Abdominal Pain:  negative  Uterine Contractions:  positive  Vaginal Bleeding:  negative  Fetal Movement:  normal  Patient denies fever, chills, nausea, vomiting , headache, visual disturbance, or dysuria  Patient's last menstrual period was 2018.  Estimated Date of Delivery: 19  Final HALEY: 2019, by Last Menstrual Period    Patient Active Problem List    Diagnosis Date Noted   • Encounter for supervision of other normal pregnancy, second trimester 2018     Priority: High   • Rubella non-immune status, antepartum 2019     Priority: Medium       Admitting DX: Pregnancy  Labor and delivery indication for care or intervention  Pregnancy Complications:  none  OB Risk Factors:   none  Labor State:    Active phase labor.    History:   has no past medical history of Addisons disease ([facility]); Asthma; Blood transfusion without reported diagnosis; Cataract; CHF (congestive heart failure) ([facility]); Diabetes ([facility]); Goiter; Head ache; Hypertension; Infectious disease; Muscle disorder; Osteoporosis; or Urinary tract infection.     has a past surgical history that includes other (2017).    OB History    Para Term  AB Living   2 1 1     1   SAB TAB Ectopic Molar Multiple Live Births             1      # Outcome Date GA Lbr Juan/2nd Weight Sex Delivery Anes PTL Lv   2 Current            1 Term 12 39w0d  3.26 kg (7 lb 3 oz) F Vag-Spont EPI N MARY          Medications:  No current facility-administered medications on file prior to encounter.      Current Outpatient Prescriptions on File Prior to Encounter   Medication Sig Dispense Refill   • Prenatal  "MV-Min-Fe Fum-FA-DHA (PRENATAL 1 PO) Take  by mouth.         Allergies:  Patient has no known allergies.    ROS:   Neuro: negative    Cardiovascular: negative  Gastro intestinal: negative  Genitourinary: negative            Physical Exam:  /91   Pulse 96   Temp 37 °C (98.6 °F) (Temporal)   Resp 20   Ht 1.626 m (5' 4\")   Wt 107.5 kg (237 lb)   LMP 06/29/2018   SpO2 94%   BMI 40.68 kg/m²   Constitutional: healthy-appearing, Well-developed, well-nourished  No JVD: while supine  HEENT: PERRLA  Breast Exam: negative  Cardio: regular rate and rhythm  Lung: unlabored respirations, no intercostal retractions or accessory muscle use  Abdomen: abdomen is soft without significant tenderness, masses, organomegaly or guarding  Extremity: extremities, peripheral pulses and reflexes normal    Cervical Exam: 100%  Cervix Dilatation: 4  Station: negative 1  Pelvis: Normal  Fetal Assessment: Fetal heart variability: moderate  FHT: 140bpm  +accels, no decels  TOCO: UCs q 2-4 min  Estimated Fetal Weight: 3000 - 3500g      Labs:  Recent Labs      04/07/19   0140   WBC  12.9*   RBC  4.99   HEMOGLOBIN  14.8   HEMATOCRIT  44.4   MCV  89.0   MCH  29.7   MCHC  33.3*   RDW  45.5   PLATELETCT  276   MPV  9.2     Prenatal Results     General (Most Recent Result)     Test Value Date Time    ABO O  10/24/18 1006    Rh POS  10/24/18 1006    Antibody screen NEG  10/24/18 1006    HbA1c       Gonorrhea Negative  09/28/18 1608    Chlamydia  by PCR Negative  09/28/18 1608    Chlamydia by DNA       RPR/Syphilus Non Reactive  01/24/19 0936    HSV 1/2 by PCR (non-serum)       HSV 1/2 (serum)       HSV 1       HSV 2       HPV (16)       HPV (18)       HPV (other)       HBsAg Negative  10/24/18 1006    HIV-1 HIV-2 Antibodies Non Reactive  10/24/18 1006    Rubella 6.30 IU/mL 10/24/18 1006    Tb             Pap Smear (Most Recent Result)     Test Value Date Time    Pap smear       Pap smear w/HPV       Pap smear w/CTNG       Pap smar w/HPV CTNG  "      Pap smear (reflex HPV ACUS)  (See Report)   10/29/18 1102    Pap smear (reflex HPV ASCUS w/CTNG)       Pathology gyn specimen  (See Report)   10/29/18 1102          Urinalysis (Most Recent Result)     Test Value Date Time    Urinalysis       Urinalysis (culture if indicated)       POC urinalysis  (See Report)   09/28/18 1534    Urine drug screen       Urine drug screen (w/o conf)       Urine culture ([id number])       Urine culture ([id number])  (See Report)   10/24/18 1006          1st Trimester     Test Value Date Time    Hgb       Hct       Fasting Glucose Tolerance       GTT, 1 hour       GTT, 2 hours       GTT, 3 hours             2nd Trimester     Test Value Date Time    Hgb 14.7 g/dL 10/24/18 1006    Hct 43.6 % 10/24/18 1006    Urinalysis       Urine Culture       AST       ALT       Uric Acid       Fasting Glucose Tolerance       GTT, 1 hour       GTT, 2 hours       GTT, 3 hours       Urine Protein/Creatinine Ratio             3rd Trimester     Test Value Date Time    Hgb 14.8 g/dL 04/07/19 0140    Hct 44.4 % 04/07/19 0140    Platelet count 276 K/uL 04/07/19 0140    GBS (PELAEZ BROTH) Negative  03/06/19 1148    GBS (Direct) Negative  03/06/19 1148    Urinalysis       Urine Culture       Urine Drug Screen       Urine Protein/Creatinine Ratio             Congenital Disease Screening     Test Value Date Time    First Trimester Screen       Quad Screen       Sickle Cell       Thalassemia       [facility]       Cystic Fibrosis Carrier Study                     Assessment:  Gestational Age:  40w2d  Labor State:   Labor, Active  Risk Factors:   none  Pregnancy Complications: none    Patient Active Problem List    Diagnosis Date Noted   • Encounter for supervision of other normal pregnancy, second trimester 09/28/2018     Priority: High   • Rubella non-immune status, antepartum 03/06/2019     Priority: Medium       Plan:   Admitted for: Active Labor, s/p PGE gel for elective IOL, pain control, GBS neg, anticipate  SONU.       ALEKS Cortes

## 2019-04-07 NOTE — ANESTHESIA PREPROCEDURE EVALUATION
Active labor. Otherwise healthy.    Relevant Problems   No relevant active problems       Physical Exam    Airway   Mallampati: II  TM distance: >3 FB  Neck ROM: full       Cardiovascular - normal exam     Dental - normal exam         Pulmonary - normal exam     Abdominal    Neurological - normal exam                 Anesthesia Plan    ASA 2       Plan - epidural   Neuraxial block will be labor analgesia              Pertinent diagnostic labs and testing reviewed    Informed Consent:    Anesthetic plan and risks discussed with patient.

## 2019-04-07 NOTE — PROGRESS NOTES
0110- pt is a , 40.2 weeks gestation IUP, with c/o uc's since  after op gel placement in triage. No c/o lof, dfm, or bleeding. SVE performed, /-2. Deb GILL called and updated, received orders for admit. Discussed poc with pt, verbalized understanding, requesting epidural.  0140- iv started, 18g right hand, lab drawn, LR infusing.   0227- pt called out thinking water broke, SROM noted clear fluid.   0228- Dr. Nash at bedside for epidural placement.  0250- pt stating she is feeling more pressure, sve performed. 6-/-1.  0305- drake placed, pt stating she feels like she needs to have BM. sve performed, 9.5/100/0. JAIRO Boyd updated.  0335- pt states her chest feels heavy, ears ringings. Pt sat in HFs, oxygen placed, and epidural decreased to 6.  0440- pt stating she is feeling pressure, SVE performed, 10100/+3, Deb GILL called for delivery  0444- viable male infant born vaginally with Deb GILL. Placed skin to skin.  0447- placenta delivered spontaneously and intact, discarded. Perineum intact. Pt repositioned for comfort for recovery

## 2019-04-07 NOTE — PROGRESS NOTES
2018- Pt presented to labor unit for OP Gel. POC discussed. Pt verbalized understanding. Denies LOF, VB. Confirms fetal movements. EFM and TOCO applied. SVE 2/50/-2, mid position.     2105- Reactive fetal heart tracing noted. 8 UC's visualized on monitor. Pt states she doesn't feel them. CONNER Montes notified of pt status. Orders received to place Prostaglandin gel.     2110- CONNER Montes at bedside discussing POC with pt. Pt and FOB verbalized understanding .    2145- Prostaglandin gel placed per protocol. Pt tolerated procedure.     2245- Caetgory one tracing noted. Denies discomfort at this time. Pt states she feels the UC's but they are tolerable.  Orders received to discharge home and pt to return for induction of labor tomorrow at scheduled time.     2249- Discharge instructions given and explained. Pt verbalized understanding.     2250- Pt discharged home with FOB undelivered. To return for IOL tomorrow at 0800.

## 2019-04-07 NOTE — L&D DELIVERY NOTE
DATE OF SERVICE:  2019    TIME:  0444 hours.    This 28-year-old  2, para 1  female with an intrauterine   pregnancy at 40 weeks and 2/7 days under epidural anesthesia, delivered a   viable male infant with Apgar scores of 8 and 9, weighing 7 pounds 12 ounces   or 3520 g.  GBS is negative.  Patient was admitted in active labor after   receiving an outpatient prostaglandin gel due to an elective induction/post   dates induction.  Patient returns in active labor, found to be 4 cm and   progressed quickly.  Patient received an epidural during labor, had a   spontaneous rupture of membranes and progressed to complete.  Delivery was via   normal spontaneous vaginal delivery to a sterile field in an occiput anterior   position.  No nuchal cord was noted.  Infant was placed on maternal abdomen   and bulb suctioned by the waiting RN.  Delayed cord clamping occurred until   the cord stopped pulsing and the cord was clamped by myself and cut by the   father of the baby.  An intact placenta with a 3-vessel cord delivered   spontaneously at 0447 hours.  Pitocin was then started wide open in the IV.    Patient had excellent hemostasis, fundus was found to be firm.  Vagina was   explored and no lacerations were noted.  Estimated blood loss was 150 mL.       ____________________________________     HELEN POWELL / ROSA ELENA    DD:  2019 05:04:52  DT:  2019 10:53:38    D#:  [id number]  Job#:  [id number]

## 2019-04-07 NOTE — PROGRESS NOTES
Pt received from l&d via w/c to room S-314.  Report received at bedside, assumed care of patient.  Pt alert and oriented, showing no s/s of distress.  Rates pain at 3/10 at this time.  Pt assessment complete, wnl.  Pt voided in l&d.  Instructed to call for assist for first time oob.  Pt oriented to room, routine, security, and call/emergency light.  POC discussed.  FOB at bedside and supportive.  Pt encouraged to call with needs.

## 2019-04-07 NOTE — PROGRESS NOTES
0700-Report received, care assumed. POC dicussed with pt. Pt denies any questions or needs at this time. FOB at bedside  0815-Pt assisted to bathroom. Stable walking without assistance, RN at side. Pt voided. Adelina care provided.   0835-Pt transferred to pp room s314. Report given to Maria G JOSEPH

## 2019-04-07 NOTE — ANESTHESIA PROCEDURE NOTES
Epidural Block  Performed by: BHAVNA JOHNSON  Authorized by: BHAVNA JOHNSON     Patient Location:  OB  Start Time:  4/7/2019 2:30 AM  End Time:  4/7/2019 2:43 AM  Reason for Block: labor analgesia    patient identified, IV checked, site marked, risks and benefits discussed, surgical consent, monitors and equipment checked, pre-op evaluation and timeout performed    Patient Position:  Sitting  Prep: ChloraPrep, patient draped and sterile technique    Monitoring:  Blood pressure, continuous pulse oximetry and heart rate  Approach:  Midline  Location:  L3-L4  Injection Technique:  ROZ saline  Skin infiltration:  Lidocaine  Strength:  1%  Dose:  4ml  Needle Type:  Tuohy  Needle Gauge:  17 G  Needle Length:  3.5 in  Loss of resistance::  7  Catheter Size:  19 G  Catheter at Skin Depth:  13  Test Dose:  Lidocaine 1.5% with epinephrine 1-to-200,000  Test Dose Result:  Negative

## 2019-04-07 NOTE — PROGRESS NOTES
Pt here for OP gel. Pt feels occ UCs, nothing too strong. Pt has been sitting on yoga ball a lot over past few weeks. Pt denies VB, LOF. +FM.     NST: Cat 1  TOCO: UCs ~ 10 min or less  Cervix (per RN): 2/50/-2, vtx    A/P: IUP at 40w1d - elective IOL, place OP gel now, labor precautions reviewed. RTC tomorrow morning for IOL or sooner prn.

## 2019-04-07 NOTE — CARE PLAN
Problem: Altered physiologic condition related to immediate post-delivery state and potential for bleeding/hemorrhage  Goal: Patient physiologically stable as evidenced by normal lochia, palpable uterine involution and vital signs within normal limits  Outcome: PROGRESSING AS EXPECTED  Fundus firm, lochia light, VSS.    Problem: Alteration in comfort related to episiotomy, vaginal repair and/or after birth pains  Goal: Patient verbalizes acceptable pain level  Outcome: PROGRESSING AS EXPECTED  Pt verbalizes acceptable pain level, taking po pain medications as needed.

## 2019-04-08 VITALS
HEIGHT: 64 IN | BODY MASS INDEX: 40.46 KG/M2 | SYSTOLIC BLOOD PRESSURE: 126 MMHG | WEIGHT: 237 LBS | RESPIRATION RATE: 18 BRPM | DIASTOLIC BLOOD PRESSURE: 88 MMHG | OXYGEN SATURATION: 94 % | TEMPERATURE: 97 F | HEART RATE: 75 BPM

## 2019-04-08 PROCEDURE — A9270 NON-COVERED ITEM OR SERVICE: HCPCS | Performed by: PHYSICIAN ASSISTANT

## 2019-04-08 PROCEDURE — 700102 HCHG RX REV CODE 250 W/ 637 OVERRIDE(OP): Performed by: PHYSICIAN ASSISTANT

## 2019-04-08 RX ORDER — IBUPROFEN 600 MG/1
600 TABLET ORAL EVERY 6 HOURS PRN
Qty: 30 TAB | Refills: 0 | Status: SHIPPED | OUTPATIENT
Start: 2019-04-08

## 2019-04-08 RX ORDER — PSEUDOEPHEDRINE HCL 30 MG
100 TABLET ORAL 2 TIMES DAILY PRN
Qty: 60 CAP | Refills: 0 | Status: SHIPPED | OUTPATIENT
Start: 2019-04-08

## 2019-04-08 RX ADMIN — OXYCODONE HYDROCHLORIDE AND ACETAMINOPHEN 1 TABLET: 5; 325 TABLET ORAL at 12:50

## 2019-04-08 RX ADMIN — Medication 1 TABLET: at 05:48

## 2019-04-08 RX ADMIN — OXYCODONE HYDROCHLORIDE AND ACETAMINOPHEN 1 TABLET: 5; 325 TABLET ORAL at 03:41

## 2019-04-08 RX ADMIN — IBUPROFEN 600 MG: 600 TABLET ORAL at 10:05

## 2019-04-08 RX ADMIN — IBUPROFEN 600 MG: 600 TABLET ORAL at 03:41

## 2019-04-08 NOTE — PROGRESS NOTES
7124- Patient stated that she is ready for discharge.  Patient discharged to home, no change noted in condition, via wheelchair with infant and FOB.

## 2019-04-08 NOTE — DISCHARGE SUMMARY
Discharge Summary:      Tiffanie Jaffe      Admit Date:   2019  Discharge Date:  2019     Admitting diagnosis:  Pregnancy  Labor and delivery indication for care or intervention  Discharge Diagnosis: Status post vaginal, spontaneous.  Pregnancy Complications: none  Tubal Ligation:  no        History:  No past medical history on file.  OB History    Para Term  AB Living   2 2 2     2   SAB TAB Ectopic Molar Multiple Live Births           0 2      # Outcome Date GA Lbr Juan/2nd Weight Sex Delivery Anes PTL Lv   2 Term 19 40w2d 06:40 / 00:04 3.52 kg (7 lb 12.2 oz) M Vag-Spont EPI N MARY   1 Term 12 39w0d  3.26 kg (7 lb 3 oz) F Vag-Spont EPI N MARY           Patient has no known allergies.  Patient Active Problem List    Diagnosis Date Noted   • Encounter for supervision of other normal pregnancy, second trimester 2018     Priority: High   • Rubella non-immune status, antepartum 2019     Priority: Medium        Hospital Course:   28 y.o. , now para 2, was admitted with the above mentioned diagnosis, underwent Active Labor, vaginal, spontaneous. Patient postpartum course was unremarkable, with progressive advancement in diet , ambulation and toleration of oral analgesia. Patient without complaints today and desires discharge.      Vitals:    19 0840 19 1000 19 1400 19 1800   BP: 124/81 135/77 134/83 125/85   Pulse: (!) 105 83 85 98   Resp: 18 18 18 18   Temp: 37.1 °C (98.8 °F) 36.6 °C (97.9 °F) 36.6 °C (97.9 °F) 36.3 °C (97.4 °F)   TempSrc: Temporal Temporal Temporal Temporal   SpO2: 96% 95% 95% 95%   Weight:       Height:           Current Facility-Administered Medications   Medication Dose   • ondansetron (ZOFRAN ODT) dispertab 4 mg  4 mg    Or   • ondansetron (ZOFRAN) syringe/vial injection 4 mg  4 mg   • oxytocin (PITOCIN) infusion (for postpartum)   mL/hr   • ibuprofen (MOTRIN) tablet 600 mg  600 mg   • oxyCODONE-acetaminophen  (PERCOCET) 5-325 MG per tablet 1 Tab  1 Tab   • oxyCODONE-acetaminophen (PERCOCET) 5-325 MG per tablet 2 Tab  2 Tab   • LR infusion     • miSOPROStol (CYTOTEC) tablet 600 mcg  600 mcg   • PRN oxytocin (PITOCIN) (20 Units/1000 mL) PRN for excessive uterine bleeding - See Admin Instr  125-999 mL/hr   • docusate sodium (COLACE) capsule 100 mg  100 mg   • prenatal plus vitamin (STUARTNATAL 1+1) 27-1 MG tablet 1 Tab  1 Tab       Exam:  Breast Exam: negative  Abdomen: Abdomen soft, non-tender. BS normal. No masses,  No organomegaly  Fundus Non Tender: yes  Incision: none  Perineum: perineum intact  Extremity: extremities, peripheral pulses and reflexes normal     Labs:  Recent Labs      04/07/19   0140  04/07/19   1249   WBC  12.9*  18.9*   RBC  4.99  4.59   HEMOGLOBIN  14.8  13.6   HEMATOCRIT  44.4  40.1   MCV  89.0  87.4   MCH  29.7  29.6   MCHC  33.3*  33.9   RDW  45.5  44.6   PLATELETCT  276  230   MPV  9.2  9.3        Activity:   Discharge to home  Pelvic Rest x 6 weeks    Assessment:  normal postpartum course  Discharge Assessment: Taking in adequate diet and fluids, no heavy bleeding or foul smelling discharge, no redness or severe pain of breasts, voiding without difficulty. desires discharge today, unsure of BCM at this time.     Follow up: .[facility] in 5 weeks for vaginal.      Discharge Meds:   Current Outpatient Prescriptions   Medication Sig Dispense Refill   • ibuprofen (MOTRIN) 600 MG Tab Take 1 Tab by mouth every 6 hours as needed (For cramping after delivery; do not give if patient is receiving ketorolac (Toradol)). 30 Tab 0   • docusate sodium 100 MG Cap Take 100 mg by mouth 2 times a day as needed for Constipation. 60 Cap 0       SUNNY HammondsPDANYELN.

## 2019-04-08 NOTE — LACTATION NOTE
This note was copied from a baby's chart.  Provided and reviewed New Beginnings booklet, frequency and duration of feeds at breast, waking techniques, and anticipated course of breastfeeding. Worked with mother on achieving deeper latch, see lactation flow sheet for details. Encouraged attendance of outpatient breastfeeding circles and follow-up at [facility] for ongoing support. Denies questions/concerns.

## 2019-04-08 NOTE — PROGRESS NOTES
Discharge instructions reviewed with patient and signed by patient. Follow up appointments and prescriptions reviewed with and given to patient. All questions addressed at this time. Patient has no further needs.

## 2019-04-08 NOTE — PROGRESS NOTES
0700- Bedside report received from JAKE Adams.  Patient denied needs.  0810- Patient assessment done.  Patient stated that she is voiding without difficulty and passing flatus.  Patient denied dizziness and stated that she is walking without difficulty.  Discussed pain management plan and patient prefers to be offered ibuprofen as it becomes available and will call for further pain intervention as needed.  Reviewed plan of care.  Patient stated desire for discharge home today.

## 2019-04-08 NOTE — DISCHARGE INSTRUCTIONS
POSTPARTUM DISCHARGE INSTRUCTIONS FOR MOM    YOB: 1990   Age: 28 y.o.               Admit Date: 4/7/2019     Discharge Date: 4/8/2019  Attending Doctor:  Seth Tirado M.D.                  Allergies:  Patient has no known allergies.    Discharged to home by car. Discharged via wheelchair, hospital escort: Yes.  Special equipment needed: Not Applicable  Belongings with: Personal  Be sure to schedule a follow-up appointment with your primary care doctor or any specialists as instructed.     Discharge Plan:   Diet Plan: Discussed  Activity Level: Discussed  Confirmed Follow up Appointment: Patient to Call and Schedule Appointment  Confirmed Symptoms Management: Discussed  Medication Reconciliation Updated: Yes  Influenza Vaccine Indication: Patient Refuses    REASONS TO CALL YOUR OBSTETRICIAN:  1.   Persistent fever or shaking chills (Temperature higher than 100.4)  2.   Heavy bleeding (soaking more than 1 pad per hour); Passing clots  3.   Foul odor from vagina  4.   Mastitis (Breast infection; breast pain, chills, fever, redness)  5.   Urinary pain, burning or frequency  6.   Episiotomy infection    8.   Severe depression longer than 24 hours    HAND WASHING  · Prior to handling the baby.  · Before breastfeeding or bottle feeding baby.  · After using the bathroom or changing the baby's diaper.      VAGINAL CARE  · Nothing inside vagina for 6 weeks: no sexual intercourse, tampons or douching.  · Bleeding may continue for 2-4 weeks.  Amount may vary.    · Call your physician for heavy bleeding which means soaking more than 1 pad per hour    BIRTH CONTROL  · It is possible to become pregnant at any time after delivery and while breastfeeding.  · Plan to discuss a method of birth control with your physician at your follow up visit. visit.    DIET AND ELIMINATION  · Eating more fiber (bran cereal, fruits, and vegetables) and drinking plenty of fluids will help to avoid constipation.  · Urinary frequency  "after childbirth is normal.    POSTPARTUM BLUES  During the first few days after birth, you may experience a sense of the \"blues\" which may include impatience, irritability or even crying.  These feeling come and go quickly.  However, as many as 1 in 10 women experience emotional symptoms known as postpartum depression.    Postpartum depression:  May start as early as the second or third day after delivery or take several weeks or months to develop.  Symptoms of \"blues\" are present, but are more intense:  Crying spells; loss of appetite; feelings of hopelessness or loss of control; fear of touching the baby; over concern or no concern at all about the baby; little or no concern about your own appearance/caring for yourself; and/or inability to sleep or excessive sleeping.  Contact your physician if you are experiencing any of these symptoms.    Crisis Hotline:  · [place] Crisis Hotline:  5-402-VPJCWWA  Or 1-957.341.9335  · [place] Crisis Hotline:  1-621.274.7403  Or 199-897-0081    PREVENTING SHAKEN BABY:  If you are angry or stressed, PUT THE BABY IN THE CRIB, step away, take some deep breaths, and wait until you are calm to care for the baby.  DO NOT SHAKE THE BABY.  You are not alone, call a supporter for help.    · Crisis [facility] Center 24/7 crisis line 859-691-7621 or 1-452.446.6809  · You can also text them, text \"ANSWER\" to 299973    QUIT SMOKING/TOBACCO USE:  I understand the use of any tobacco products increases my chance of suffering from future heart disease and could cause other illnesses which may shorten my life. Quitting the use of tobacco products is the single most important thing I can do to improve my health. For further information on smoking / tobacco cessation call a Toll Free Quit Line at 1-932.887.5880 (*[employer] [place]) or 1-260.627.6834 (American [employer] Association) or you can access the web based program at www.lungusa.org.    · [place] Tobacco Users Help Line:  (452) 339-2005       " Toll Free: 4-082-352-5543  · Quit Tobacco Program [facility] (928)993-0524    DEPRESSION / SUICIDE RISK:  As you are discharged from this [facility], it is important to learn how to keep safe from harming yourself.    Recognize the warning signs:  · Abrupt changes in personality, positive or negative- including increase in energy   · Giving away possessions  · Change in eating patterns- significant weight changes-  positive or negative  · Change in sleeping patterns- unable to sleep or sleeping all the time   · Unwillingness or inability to communicate  · Depression  · Unusual sadness, discouragement and loneliness  · Talk of wanting to die  · Neglect of personal appearance   · Rebelliousness- reckless behavior  · Withdrawal from people/activities they love  · Confusion- inability to concentrate     If you or a loved one observes any of these behaviors or has concerns about self-harm, here's what you can do:  · Talk about it- your feelings and reasons for harming yourself  · Remove any means that you might use to hurt yourself (examples: pills, rope, extension cords, firearm)  · Get professional help from the community (Mental Health, Substance Abuse, psychological counseling)  · Do not be alone:Call your Safe Contact- someone whom you trust who will be there for you.  · Call your local CRISIS HOTLINE 378-1751 or 997-129-4070  · Call your local Children's Mobile Crisis Response Team [place] (263) 481-4083 or www.WP Fail-Safe  · Call the toll free National Suicide Prevention Hotlines   · National Suicide Prevention Lifeline 770-228-MSWS (0230)  · National Hope Line Network 800-SUICIDE (999-8950)    DISCHARGE SURVEY:  Thank you for choosing [facility].  We hope we provided you with very good care.  You may be receiving a survey in the mail.  Please fill it out.  Your opinion is valuable to us.            My signature on this form indicates that:  1.  I have reviewed and  understand the above information  2.  My questions regarding this information have been answered to my satisfaction.  3.  I have formulated a plan with my discharge nurse to obtain my prescribed medication for home.

## 2019-04-08 NOTE — CARE PLAN
Problem: Altered physiologic condition related to immediate post-delivery state and potential for bleeding/hemorrhage  Goal: Patient physiologically stable as evidenced by normal lochia, palpable uterine involution and vital signs within normal limits  Outcome: PROGRESSING AS EXPECTED  Fundus firm. Lochia scant. Vital signs WDL.     Problem: Potential for postpartum infection related to presence of episiotomy/vaginal tear and/or uterine contamination  Goal: Patient will be absent from signs and symptoms of infection  Outcome: PROGRESSING AS EXPECTED  Patient is afebrile. No signs and symptoms of infection noted  .

## 2019-05-21 ENCOUNTER — POST PARTUM (OUTPATIENT)
Dept: OBGYN | Facility: CLINIC | Age: 29
End: 2019-05-21

## 2019-05-21 VITALS — DIASTOLIC BLOOD PRESSURE: 68 MMHG | SYSTOLIC BLOOD PRESSURE: 114 MMHG | WEIGHT: 221 LBS | BODY MASS INDEX: 37.93 KG/M2

## 2019-05-21 PROBLEM — O09.899 RUBELLA NON-IMMUNE STATUS, ANTEPARTUM: Status: RESOLVED | Noted: 2019-03-06 | Resolved: 2019-05-21

## 2019-05-21 PROBLEM — Z34.82 ENCOUNTER FOR SUPERVISION OF OTHER NORMAL PREGNANCY, SECOND TRIMESTER: Status: RESOLVED | Noted: 2018-09-28 | Resolved: 2019-05-21

## 2019-05-21 PROBLEM — Z28.39 RUBELLA NON-IMMUNE STATUS, ANTEPARTUM: Status: RESOLVED | Noted: 2019-03-06 | Resolved: 2019-05-21

## 2019-05-21 PROCEDURE — 0503F POSTPARTUM CARE VISIT: CPT | Performed by: PHYSICIAN ASSISTANT

## 2019-05-21 ASSESSMENT — ENCOUNTER SYMPTOMS
RESPIRATORY NEGATIVE: 1
CARDIOVASCULAR NEGATIVE: 1
DEPRESSION: 0
PSYCHIATRIC NEGATIVE: 1
NEUROLOGICAL NEGATIVE: 1
CONSTITUTIONAL NEGATIVE: 1
MUSCULOSKELETAL NEGATIVE: 1
GASTROINTESTINAL NEGATIVE: 1
EYES NEGATIVE: 1

## 2019-05-21 NOTE — PROGRESS NOTES
"Pt here today for postpartum exam.  Delivery Date 4/7/19  Currently: breast feeding   BCM: pt declines BC , [facility] and [facility].   Good ph:[phone]  Pt states when she was discharged she felt that it was too early, she was very swollen and her bladder was \"distended\" and upset about only being sent home with ibuprofen.   Chaperone offered and declined      "

## 2019-05-21 NOTE — PROGRESS NOTES
"Subjective:      Tiffanie Jaffe is a 28 y.o. female who presents with Postpartum visit today. 5 wk s/p  at term without complications after PGE gel placement. Pt is tearful when talking abotu PP period, as she feels she was sent home quickly and without adequate pain management. Pt had significant LE swelling and a \"distended\" bladder while at home, almost needed to go to ER, but since has been doing well. Pt has no complaints- denies heavy vaginal bleeding, depression, intercourse, pain or problems with BF. PAP wnl 10/18 - repeat 10/21. BCM desired is condoms, breastfeeding and natural family planning. Pt will be breastfeeding exclusively and is planning for possibly one more child. Pt urged to have condoms on hand at all times, as I explained BF isnt completely reliable for BCM.         HPI    Review of Systems   Constitutional: Negative.    HENT: Negative.    Eyes: Negative.    Respiratory: Negative.    Cardiovascular: Negative.    Gastrointestinal: Negative.    Genitourinary: Negative.    Musculoskeletal: Negative.    Skin: Negative.    Neurological: Negative.    Endo/Heme/Allergies: Negative.    Psychiatric/Behavioral: Negative.  Negative for depression.          Objective:     /68   Wt 100.2 kg (221 lb)   LMP 2018   BMI 37.93 kg/m²      Physical Exam   Constitutional: She appears well-developed and well-nourished.   HENT:   Head: Normocephalic and atraumatic.   Eyes: Pupils are equal, round, and reactive to light.   Neck: Normal range of motion. Neck supple. No thyromegaly present.   Cardiovascular: Normal rate, regular rhythm and normal heart sounds.    Pulmonary/Chest: Effort normal and breath sounds normal. No respiratory distress.   Abdominal: Soft. Bowel sounds are normal. She exhibits no distension. There is no tenderness.   Genitourinary: Vagina normal and uterus normal. Pelvic exam was performed with patient supine. There is no rash or tenderness on the right labia. There is no " rash or tenderness on the left labia. Uterus is not deviated, not enlarged and not tender. Cervix exhibits no motion tenderness. Right adnexum displays no mass and no tenderness. Left adnexum displays no mass and no tenderness. No erythema in the vagina. No foreign body in the vagina. No signs of injury around the vagina. No vaginal discharge found.   Neurological: She is alert. She has normal reflexes.   Skin: Skin is warm and dry. No erythema.   Psychiatric: She has a normal mood and affect. Her behavior is normal. Thought content normal.   Vitals reviewed.              Assessment/Plan:     1. Postpartum care following vaginal delivery  - PAP wnl 10/18 - repeat 10/21  - Condoms for BCM - call if other methods desired.